# Patient Record
Sex: FEMALE | Race: BLACK OR AFRICAN AMERICAN | NOT HISPANIC OR LATINO | Employment: OTHER | URBAN - METROPOLITAN AREA
[De-identification: names, ages, dates, MRNs, and addresses within clinical notes are randomized per-mention and may not be internally consistent; named-entity substitution may affect disease eponyms.]

---

## 2017-03-12 ENCOUNTER — HOSPITAL ENCOUNTER (OUTPATIENT)
Dept: ADMINISTRATIVE | Facility: HOSPITAL | Age: 57
End: 2017-03-13
Attending: INTERNAL MEDICINE | Admitting: INTERNAL MEDICINE

## 2017-09-21 ENCOUNTER — HISTORICAL (OUTPATIENT)
Dept: RADIOLOGY | Facility: HOSPITAL | Age: 57
End: 2017-09-21

## 2017-10-26 ENCOUNTER — HISTORICAL (OUTPATIENT)
Dept: RADIOLOGY | Facility: HOSPITAL | Age: 57
End: 2017-10-26

## 2018-02-28 ENCOUNTER — HOSPITAL ENCOUNTER (OUTPATIENT)
Dept: MEDSURG UNIT | Facility: HOSPITAL | Age: 58
End: 2018-03-01
Attending: SURGERY | Admitting: SURGERY

## 2018-02-28 LAB
ABS NEUT (OLG): 7.3 X10(3)/MCL (ref 1.5–6.9)
ALBUMIN SERPL-MCNC: 4.1 GM/DL (ref 3.4–5)
ALBUMIN/GLOB SERPL: 0.9 RATIO
ALP SERPL-CCNC: 166 UNIT/L (ref 30–113)
ALT SERPL-CCNC: 22 UNIT/L (ref 10–45)
APTT PPP: 28.5 SECOND(S) (ref 25–35)
AST SERPL-CCNC: 16 UNIT/L (ref 15–37)
BASOPHILS # BLD AUTO: 0 X10(3)/MCL (ref 0–0.1)
BASOPHILS NFR BLD AUTO: 0 % (ref 0–1)
BILIRUB SERPL-MCNC: 0.5 MG/DL (ref 0.1–0.9)
BILIRUBIN DIRECT+TOT PNL SERPL-MCNC: 0.2 MG/DL (ref 0–0.3)
BILIRUBIN DIRECT+TOT PNL SERPL-MCNC: 0.3 MG/DL
BUN SERPL-MCNC: 26 MG/DL (ref 10–20)
CALCIUM SERPL-MCNC: 9.7 MG/DL (ref 8–10.5)
CHLORIDE SERPL-SCNC: 97 MMOL/L (ref 100–108)
CO2 SERPL-SCNC: 28 MMOL/L (ref 21–35)
CREAT SERPL-MCNC: 6.12 MG/DL (ref 0.7–1.3)
EOSINOPHIL # BLD AUTO: 0 X10(3)/MCL (ref 0–0.6)
EOSINOPHIL NFR BLD AUTO: 0 % (ref 0–5)
ERYTHROCYTE [DISTWIDTH] IN BLOOD BY AUTOMATED COUNT: 14.7 % (ref 11.5–17)
GLOBULIN SER-MCNC: 4.6 GM/DL
GLUCOSE SERPL-MCNC: 98 MG/DL (ref 75–116)
HCT VFR BLD AUTO: 45.4 % (ref 36–48)
HGB BLD-MCNC: 14.2 GM/DL (ref 12–16)
INR PPP: 1 (ref 0–1.2)
LIPASE SERPL-CCNC: 4186 UNIT/L (ref 21–261)
LYMPHOCYTES # BLD AUTO: 1.4 X10(3)/MCL (ref 0.5–4.1)
LYMPHOCYTES NFR BLD AUTO: 14.3 % (ref 15–40)
MCH RBC QN AUTO: 32 PG (ref 27–34)
MCHC RBC AUTO-ENTMCNC: 31 GM/DL (ref 31–36)
MCV RBC AUTO: 102 FL (ref 80–99)
MONOCYTES # BLD AUTO: 0.9 X10(3)/MCL (ref 0–1.1)
MONOCYTES NFR BLD AUTO: 9 % (ref 4–12)
NEUTROPHILS # BLD AUTO: 7.3 X10(3)/MCL (ref 1.5–6.9)
NEUTROPHILS NFR BLD AUTO: 76 % (ref 43–75)
PLATELET # BLD AUTO: 119 X10(3)/MCL (ref 140–400)
PMV BLD AUTO: 11.8 FL (ref 6.8–10)
POTASSIUM SERPL-SCNC: 3.7 MMOL/L (ref 3.6–5.2)
PROT SERPL-MCNC: 8.7 GM/DL (ref 6.4–8.2)
PROTHROMBIN TIME: 10.6 SECOND(S) (ref 9–12)
RBC # BLD AUTO: 4.43 X10(6)/MCL (ref 4.2–5.4)
SODIUM SERPL-SCNC: 139 MMOL/L (ref 135–145)
WBC # SPEC AUTO: 9.6 X10(3)/MCL (ref 4.5–11.5)

## 2018-03-01 LAB
ABS NEUT (OLG): 8.7 X10(3)/MCL (ref 1.5–6.9)
BASOPHILS # BLD AUTO: 0 X10(3)/MCL (ref 0–0.1)
BASOPHILS NFR BLD AUTO: 0 % (ref 0–1)
EOSINOPHIL # BLD AUTO: 0 X10(3)/MCL (ref 0–0.6)
EOSINOPHIL NFR BLD AUTO: 0 % (ref 0–5)
ERYTHROCYTE [DISTWIDTH] IN BLOOD BY AUTOMATED COUNT: 14.5 % (ref 11.5–17)
HCT VFR BLD AUTO: 39.2 % (ref 36–48)
HGB BLD-MCNC: 12 GM/DL (ref 12–16)
LYMPHOCYTES # BLD AUTO: 0.8 X10(3)/MCL (ref 0.5–4.1)
LYMPHOCYTES NFR BLD AUTO: 7.7 % (ref 15–40)
MCH RBC QN AUTO: 32 PG (ref 27–34)
MCHC RBC AUTO-ENTMCNC: 31 GM/DL (ref 31–36)
MCV RBC AUTO: 104 FL (ref 80–99)
MONOCYTES # BLD AUTO: 0.7 X10(3)/MCL (ref 0–1.1)
MONOCYTES NFR BLD AUTO: 6 % (ref 4–12)
NEUTROPHILS # BLD AUTO: 8.7 X10(3)/MCL (ref 1.5–6.9)
NEUTROPHILS NFR BLD AUTO: 86 % (ref 43–75)
PLATELET # BLD AUTO: 103 X10(3)/MCL (ref 140–400)
PMV BLD AUTO: 11.9 FL (ref 6.8–10)
RBC # BLD AUTO: 3.75 X10(6)/MCL (ref 4.2–5.4)
WBC # SPEC AUTO: 10.2 X10(3)/MCL (ref 4.5–11.5)

## 2018-09-11 ENCOUNTER — HISTORICAL (OUTPATIENT)
Dept: RADIOLOGY | Facility: HOSPITAL | Age: 58
End: 2018-09-11

## 2019-01-22 ENCOUNTER — HOSPITAL ENCOUNTER (OUTPATIENT)
Dept: MEDSURG UNIT | Facility: HOSPITAL | Age: 59
End: 2019-01-23
Attending: INTERNAL MEDICINE | Admitting: INTERNAL MEDICINE

## 2019-01-22 LAB
ABS NEUT (OLG): 8.6 X10(3)/MCL (ref 1.5–6.9)
ALBUMIN SERPL-MCNC: 4.2 GM/DL (ref 3.4–5)
ALBUMIN/GLOB SERPL: 1 RATIO
ALP SERPL-CCNC: 160 UNIT/L (ref 30–113)
ALT SERPL-CCNC: 49 UNIT/L (ref 10–45)
AST SERPL-CCNC: 32 UNIT/L (ref 15–37)
BILIRUB SERPL-MCNC: 0.5 MG/DL (ref 0.1–0.9)
BILIRUBIN DIRECT+TOT PNL SERPL-MCNC: 0.2 MG/DL (ref 0–0.3)
BILIRUBIN DIRECT+TOT PNL SERPL-MCNC: 0.3 MG/DL
BUN SERPL-MCNC: 77 MG/DL (ref 10–20)
CALCIUM SERPL-MCNC: 10.8 MG/DL (ref 8–10.5)
CHLORIDE SERPL-SCNC: 96 MMOL/L (ref 100–108)
CO2 SERPL-SCNC: 23 MMOL/L (ref 21–35)
CREAT SERPL-MCNC: 10.99 MG/DL (ref 0.7–1.3)
ERYTHROCYTE [DISTWIDTH] IN BLOOD BY AUTOMATED COUNT: 15.9 % (ref 11.5–17)
GLOBULIN SER-MCNC: 4.4 GM/DL
GLUCOSE SERPL-MCNC: 115 MG/DL (ref 75–116)
HCT VFR BLD AUTO: 39.8 % (ref 36–48)
HGB BLD-MCNC: 12.2 GM/DL (ref 12–16)
IMM GRANULOCYTES # BLD AUTO: 0.06 10*3/UL (ref 0–0.02)
IMM GRANULOCYTES NFR BLD AUTO: 0.5 % (ref 0–0.43)
LIPASE SERPL-CCNC: 694 UNIT/L (ref 21–261)
LYMPHOCYTES # BLD AUTO: 2.1 X10(3)/MCL (ref 0.5–4.1)
LYMPHOCYTES NFR BLD AUTO: 18 % (ref 15–40)
MACROCYTES BLD QL SMEAR: ABNORMAL
MCH RBC QN AUTO: 33 PG (ref 27–34)
MCHC RBC AUTO-ENTMCNC: 31 GM/DL (ref 31–36)
MCV RBC AUTO: 108 FL (ref 80–99)
MONOCYTES # BLD AUTO: 1.1 X10(3)/MCL (ref 0–1.1)
MONOCYTES NFR BLD AUTO: 9 % (ref 4–12)
NEUTROPHILS # BLD AUTO: 8.6 X10(3)/MCL (ref 1.5–6.9)
NEUTROPHILS NFR BLD AUTO: 72 % (ref 43–75)
PLATELET # BLD AUTO: 114 X10(3)/MCL (ref 140–400)
PLATELET # BLD EST: ABNORMAL 10*3/UL
PMV BLD AUTO: 11.8 FL (ref 6.8–10)
POTASSIUM SERPL-SCNC: 6.1 MMOL/L (ref 3.6–5.2)
PROT SERPL-MCNC: 8.6 GM/DL (ref 6.4–8.2)
RBC # BLD AUTO: 3.68 X10(6)/MCL (ref 4.2–5.4)
SODIUM SERPL-SCNC: 137 MMOL/L (ref 135–145)
WBC # SPEC AUTO: 11.9 X10(3)/MCL (ref 4.5–11.5)

## 2019-01-23 LAB
CHOLEST SERPL-MCNC: 141 MG/DL (ref 140–200)
CHOLEST/HDLC SERPL: 2 MG/DL (ref 0–8)
HDLC SERPL-MCNC: 61 MG/DL (ref 35–59)
LDLC SERPL CALC-MCNC: 68 MG/DL (ref 100–129)
LIPASE SERPL-CCNC: 454 UNIT/L (ref 21–261)
TRIGL SERPL-MCNC: 70 MG/DL (ref 35–150)
VLDLC SERPL CALC-MCNC: 14 MG/DL

## 2019-04-11 ENCOUNTER — HOSPITAL ENCOUNTER (OUTPATIENT)
Dept: INTENSIVE CARE | Facility: HOSPITAL | Age: 59
End: 2019-04-11
Attending: FAMILY MEDICINE | Admitting: FAMILY MEDICINE

## 2019-04-11 LAB
ABS NEUT (OLG): 6.6 X10(3)/MCL (ref 1.5–6.9)
ALBUMIN SERPL-MCNC: 2.9 GM/DL (ref 3.4–5)
ALBUMIN/GLOB SERPL: 0.6 RATIO
ALP SERPL-CCNC: 181 UNIT/L (ref 30–113)
ALT SERPL-CCNC: 8 UNIT/L (ref 10–45)
AMYLASE SERPL-CCNC: 73 UNIT/L (ref 25–115)
ANISOCYTOSIS BLD QL SMEAR: ABNORMAL
AST SERPL-CCNC: 18 UNIT/L (ref 15–37)
BASOPHILS # BLD AUTO: 0 X10(3)/MCL (ref 0–0.1)
BASOPHILS NFR BLD AUTO: 0 % (ref 0–1)
BILIRUB SERPL-MCNC: 0.7 MG/DL (ref 0.1–0.9)
BILIRUBIN DIRECT+TOT PNL SERPL-MCNC: 0.3 MG/DL (ref 0–0.3)
BILIRUBIN DIRECT+TOT PNL SERPL-MCNC: 0.4 MG/DL
BUN SERPL-MCNC: 17 MG/DL (ref 10–20)
CALCIUM SERPL-MCNC: 9.5 MG/DL (ref 8–10.5)
CHLORIDE SERPL-SCNC: 103 MMOL/L (ref 100–108)
CO2 SERPL-SCNC: 23 MMOL/L (ref 21–35)
CREAT SERPL-MCNC: 4.53 MG/DL (ref 0.7–1.3)
EOSINOPHIL # BLD AUTO: 0 X10(3)/MCL (ref 0–0.6)
EOSINOPHIL NFR BLD AUTO: 0 % (ref 0–5)
ERYTHROCYTE [DISTWIDTH] IN BLOOD BY AUTOMATED COUNT: 21.3 % (ref 11.5–17)
GLOBULIN SER-MCNC: 4.5 GM/DL
GLUCOSE SERPL-MCNC: 157 MG/DL (ref 75–116)
HCO3 UR-SCNC: 17.8 MMOL/L (ref 22–26)
HCT VFR BLD AUTO: 33 % (ref 36–48)
HGB BLD-MCNC: 10 GM/DL (ref 12–16)
IMM GRANULOCYTES # BLD AUTO: 0.13 10*3/UL (ref 0–0.02)
IMM GRANULOCYTES NFR BLD AUTO: 1.4 % (ref 0–0.43)
INR PPP: 1 (ref 0–1.2)
LACTATE SERPL-SCNC: 4.5 MMOL/L (ref 0.4–2)
LACTATE SERPL-SCNC: 5.1 MMOL/L (ref 0.4–2)
LACTATE SERPL-SCNC: 6.1 MMOL/L (ref 0.4–2)
LACTATE SERPL-SCNC: 6.4 MMOL/L (ref 0.4–2)
LACTATE SERPL-SCNC: 6.8 MMOL/L (ref 0.4–2)
LIPASE SERPL-CCNC: 255 UNIT/L (ref 21–261)
LYMPHOCYTES # BLD AUTO: 1.8 X10(3)/MCL (ref 0.5–4.1)
LYMPHOCYTES NFR BLD AUTO: 19 % (ref 15–40)
MACROCYTES BLD QL SMEAR: ABNORMAL
MAGNESIUM SERPL-MCNC: 2.2 MG/DL (ref 1.8–2.4)
MCH RBC QN AUTO: 32 PG (ref 27–34)
MCHC RBC AUTO-ENTMCNC: 30 GM/DL (ref 31–36)
MCV RBC AUTO: 106 FL (ref 80–99)
MONOCYTES # BLD AUTO: 0.8 X10(3)/MCL (ref 0–1.1)
MONOCYTES NFR BLD AUTO: 9 % (ref 4–12)
NEUTROPHILS # BLD AUTO: 6.6 X10(3)/MCL (ref 1.5–6.9)
NEUTROPHILS NFR BLD AUTO: 70 % (ref 43–75)
PCO2 BLDA: 26.4 MMHG (ref 35–45)
PH SMN: 7.44 [PH] (ref 7.35–7.45)
PHOSPHATE SERPL-MCNC: 2.2 MG/DL (ref 2.6–4.7)
PLATELET # BLD AUTO: 138 X10(3)/MCL (ref 140–400)
PLATELET # BLD EST: ADEQUATE 10*3/UL
PMV BLD AUTO: 10.3 FL (ref 6.8–10)
PO2 BLDA: 83 MMHG (ref 80–100)
POC ALLENS TEST: POSITIVE
POC BE: -6 MMOL/L (ref -2–3)
POC SAMPLESOURCE: NORMAL
POC SATURATED O2: 97 % (ref 96–97)
POC SITE: NORMAL
POC TCO2: 19 MMOL/L (ref 24–29)
POC TREATMENT: NORMAL
POIKILOCYTOSIS BLD QL SMEAR: ABNORMAL
POTASSIUM SERPL-SCNC: 2.6 MMOL/L (ref 3.6–5.2)
PROT SERPL-MCNC: 7.4 GM/DL (ref 6.4–8.2)
PROTHROMBIN TIME: 10.7 SECOND(S) (ref 9–12)
RBC # BLD AUTO: 3.12 X10(6)/MCL (ref 4.2–5.4)
RBC MORPH BLD: ABNORMAL
SODIUM SERPL-SCNC: 143 MMOL/L (ref 135–145)
TSH SERPL-ACNC: 5.41 MIU/ML (ref 0.36–3.74)
WBC # SPEC AUTO: 9.4 X10(3)/MCL (ref 4.5–11.5)

## 2022-04-10 ENCOUNTER — HISTORICAL (OUTPATIENT)
Dept: ADMINISTRATIVE | Facility: HOSPITAL | Age: 62
End: 2022-04-10
Payer: MEDICAID

## 2022-04-29 VITALS
DIASTOLIC BLOOD PRESSURE: 80 MMHG | WEIGHT: 199.94 LBS | BODY MASS INDEX: 39.25 KG/M2 | SYSTOLIC BLOOD PRESSURE: 124 MMHG | HEIGHT: 60 IN | OXYGEN SATURATION: 96 %

## 2022-04-30 NOTE — OP NOTE
Patient:   Dinorah Santos            MRN: [id number]            FIN: [id number]               Age:   58 years     Sex:  Female     :  1960   Associated Diagnoses:   ESRD on dialysis; AP - Abdominal pain; Acute appendicitis   Author:   Tonie Kaufman MD      Operative Note   Operative Information   Date/ Time:  2018 12:01:00.     Procedures Performed: Procedure Code   Laparoscopy, surgical, appendectomy (73129)..     Indications: 58-year-old -American female with multiple medical issues and high suspicion of acute appendicitis based upon imaging and clinical evaluation.     Preoperative Diagnosis: ESRD on dialysis (UVR84-GF N18.6), AP - Abdominal pain (PNED [id number]), Acute appendicitis (FDT11-YT K35.80).     Postoperative Diagnosis: ESRD on dialysis (AKT95-XW N18.6), AP - Abdominal pain (PNED [id number]), Acute appendicitis (GIU67-GY K35.80).     Surgeon: Tonie Kaufman MD.     Anesthesia: Gen..     Speciman Removed: appendix and mesoappendix.     Description of Procedure/Findings/    Complications: procedure in detail:  After appropriate consents were obtained the patient was brought to the operating theater laid in the supine position.  General endotracheal intubation and anesthesia were performed without incident.  Then the abdomen from the nipples down to bilateral groins were sterilely prepped and draped in normal surgical fashion using chlorhexidine.    An infraumbilical site was infiltrated with 1% lidocaine.   a #15 blade was used to dissect down to the level of the fascia.  A Veress needle was introduced in the abdomen on the 1st pass and the abdominal cavity was insufflated to 15 mm Hg pressure.  A 5 mm Visiport trochar  was introduced into the abdomen without incident.  The abdominal contents were then inspected.  In the right lower quadrant was noted to be some turbid fluid along with a grossly edematous and   inflamed appendix.  There was noted to be the start of necrosis at the tip without gross perforation.    A 2nd dissecting trocar was then placed in the suprapubic region measuring 5 mm in size  under direct visualization and then the left lower quadrant 12 mm trocar was placed under direct visualization.    The patient was then placed in slight Trendelenburg with right side up and the small bowel located within the pelvis was advanced to the right upper quadrant using atraumatic graspers.    The appendix was lightly grasping the antimesenteric border close to the base  and dissection of the mesoappendix was performed using an Endo Harmonic scalpel.   Appendiceal vessel was taken without incident or bloody oozing  and the base of the appendix was transected using a linear endo-stapling device blue load.   The base of the appendix was transected without incident and visually inspected  for staple line  abnormalities or bleeding.   Neither were noted and the appendix was placed within an Endo Catch bag.    The right colic gutter was then irrigated well with saline and suctioned of all fibrinous debris and fluid collections.  The base of the appendix was monitored for a period of time to identify any oozing from transected vessels or on the appendiceal tip and none were noted.   The Endo Catch bag was retrieved to the lower left quadrant port site without difficulty.  The remaining trochars were then removed under direct visualization and no bleeding was noted on the abdominal wall.  The left lower quadrant 12 mm trocar site was closed using 0 Vicryl in a figure-of-eight fashion.   The skin overlying each of the trocar sites were then closed using 4-0 Monocryl in a subcuticular fashion.   a sterile dressing was placed over the 3 trocar sites.  The patient was relieved of anesthesia in a stable condition,  all  instrument counts were correct ×3.  the patient was then transferred to postanesthesia care unit in stable  condition  .     Esimated blood loss: loss  3  cc.     Findings: acute appendicitis.     Complications: None.

## 2022-04-30 NOTE — ED PROVIDER NOTES
Patient:   Dinorah Santos            MRN: [id number]            FIN: [id number]               Age:   58 years     Sex:  Female     :  1960   Associated Diagnoses:   Acute appendicitis   Author:   Yaya King MD      Basic Information   Time seen: Date & time 2018 17:20:00.   History source: Patient, EMS.   Arrival mode: Ambulance.   History limitation: Cognitive impairment.   Additional information: Chief Complaint from Nursing Triage Note : Chief Complaint   2018 16:49 CST      Chief Complaint           brought per [facility] with c/o Lt lower abd pain since this am  .      History of Present Illness   The patient presents with abdominal pain.  The onset was this am.  The course/duration of symptoms is worsening.  The character of symptoms is sharp.  The degree at onset was moderate.  The Location of pain at onset was left, upper and abdominal.  The degree at present is moderate.  The Location of pain at present is left, lower and abdominal.  Radiating pain: none. The exacerbating factor is none.  The relieving factor is none.  Therapy today: none.  Risk factors consist of obesity and ESRD on HD.  Associated symptoms: vomiting, Vomiting times once after arrival in ED., denies diarrhea, denies fever and denies chills.        Review of Systems   Constitutional symptoms:  Negative except as documented in HPI.   Skin symptoms:  Negative except as documented in HPI.   Eye symptoms:  Negative except as documented in HPI.   ENMT symptoms:  Negative except as documented in HPI.   Respiratory symptoms:  Negative except as documented in HPI.   Cardiovascular symptoms:  Negative except as documented in HPI.   Gastrointestinal symptoms:  Abdominal pain, moderate, left lower quadrant, vomiting.    Genitourinary symptoms:  Negative except as documented in HPI.   Musculoskeletal symptoms:  Negative except as documented in HPI.   Neurologic symptoms:  Negative except as documented in HPI.   Psychiatric  symptoms:  Negative except as documented in HPI.   Endocrine symptoms:  Negative except as documented in HPI.   Hematologic/Lymphatic symptoms:  Negative except as documented in HPI.   Allergy/immunologic symptoms:  Negative except as documented in HPI.             Additional review of systems information: All systems reviewed as documented in chart.      Health Status   Allergies:    Allergies (1) Active Reaction  No Known Allergies None Documented  , no known allergies.   Medications:  (Selected)   Prescriptions  Prescribed  MiraLax oral powder for reconstitution: 17 gm, Oral, Daily, dissolve in water before taking, X 90 day(s), # 1,530 gm, 1 Refill(s)  Phenergan 25 mg Tab: 25 mg = 1 tab(s), Oral, q6hr, PRN PRN as needed for nausea/vomiting, # 4 tab(s), 0 Refill(s), Pharmacy: [facility] [place]  Documented Medications  Documented  Aripiprazole 30 Mg Tablet: 30 mg = 1 tab(s), Oral, qPM  Docu Soft 100 mg oral capsule: 100 mg = 1 cap(s), Oral, BID, PRN PRN for constipation, 0 Refill(s)  OLANZapine 10 mg oral tablet: 10 mg = 1 tab(s), Oral, At Bedtime, # 30 tab(s), 0 Refill(s)  Renvela 800 mg oral tablet: 2,400 mg = 3 tab(s), Oral, TID, # 270 tab(s), 0 Refill(s)  Sensipar 30 mg oral tablet: 30 mg = 1 tab(s), Oral, Daily, # 30 tab(s), 0 Refill(s)  aspirin 81 mg oral tablet: 81 mg = 1 tab(s), Oral, Daily, # 90 tab(s), 0 Refill(s)  atorvastatin 20 mg oral tablet: 20 mg = 1 tab(s), Oral, At Bedtime, # 90 tab(s), 0 Refill(s)  clonazePAM 1 mg oral tablet: 1 mg = 1 tab(s), Oral, Daily, 0 Refill(s)  divalproex sodium 250 mg oral delayed release tablet: 250 mg = 1 tab(s), Oral, BID, # 270 tab(s), 0 Refill(s)  levothyroxine 175 mcg (0.175 mg) oral tablet: 175 mcg = 1 tab(s), Oral, Daily, # 30 tab(s), 0 Refill(s)  midodrine 10 mg oral tablet: 10 mg = 1 tab(s), Oral, TID, # 90 tab(s), 0 Refill(s)  oxcarbazepine 600 mg oral tablet: 600 mg = 1 tab(s), Oral, BID, # 60 tab(s), 0 Refill(s)  traZODone 100 mg oral  tablet: 100 mg = 1 tab(s), Oral, At Bedtime, # 180 tab(s), 0 Refill(s)  zolpidem 10 mg oral tablet: 10 mg = 1 tab(s), Oral, Once a day (at bedtime), PRN PRN as needed for sleep, 0 Refill(s).      Past Medical/ Family/ Social History   Medical history:    Active  ESRD on dialysis ([id number])  Hyperlipidemia (58140543)  Hypothyroid (553786840)  Resolved  Schizophrenia (70061643):  Resolved..   Surgical history:    Knee arthroplasty ([id number]).  Comments:  9/28/2017 06:30 - Stefnaia JUSTIN, Allie hansen.   Family history:    Unable to obtain..   Social history:    Social & Psychosocial Habits    Alcohol  02/20/2017  Use: Never    Substance Abuse  02/20/2017  Use: Never    Tobacco  02/20/2017  Use: Never smoker  , Alcohol use: Denies, Tobacco use: Denies, Drug use: Denies, Occupation: On disability, Family/social situation: Unmarried.   Problem list:    Active Problems (3)  ESRD on dialysis   Hypothyroid   Obesity   .      Physical Examination               Vital Signs   Vital Signs   2/28/2018 16:49 CST      Temperature Temporal Artery               36.1 DegC  LOW                             Peripheral Pulse Rate     83 bpm                             Respiratory Rate          16 br/min                             SpO2                      97 %                             Oxygen Therapy            Room air                             Systolic Blood Pressure   122 mmHg                             Diastolic Blood Pressure  64 mmHg  .      Vital Signs (last 24 hrs)_____  Last Charted___________  Heart Rate Peripheral   83 bpm  (FEB 28 16:49)  Resp Rate         16 br/min  (FEB 28 16:49)  SBP      122 mmHg  (FEB 28 16:49)  DBP      64 mmHg  (FEB 28 16:49)  SpO2      97 %  (FEB 28 16:49)  .   Measurements   2/28/2018 16:49 CST      Weight Dosing             90.7 kg                             Weight Measured and Calculated in Lbs     199.96 lb                             Weight Estimated           90.7 kg                             Height/Length Dosing      152 cm                             Height/Length Estimated   154 cm                             Body Mass Index Estimated 38.24 kg/m2  .   Basic Oxygen Information   2/28/2018 16:49 CST      SpO2                      97 %                             Oxygen Therapy            Room air  .   General:  Alert, no acute distress.    Skin:  Warm, dry, intact, normal for ethnicity.    Head:  Normocephalic, atraumatic.    Neck:  Supple, no tenderness.    Eye:  Pupils are equal, round and reactive to light, extraocular movements are intact.    Ears, nose, mouth and throat:  Oral mucosa moist.   Cardiovascular:  Regular rate and rhythm, No murmur, Normal peripheral perfusion, No edema.    Respiratory:  Lungs are clear to auscultation, respirations are non-labored, breath sounds are equal, Symmetrical chest wall expansion.    Chest wall:  No tenderness, No deformity.    Back:  Nontender, Normal range of motion, Normal alignment.    Musculoskeletal:  Normal ROM, normal strength, no tenderness, no swelling, no deformity.    Gastrointestinal:  Soft, Non distended, Normal bowel sounds, Tenderness: Moderate, left upper quadrant, left lower quadrant.    Neurological:  Alert and oriented to person, place, time, and situation, No focal neurological deficit observed, normal sensory observed, normal motor observed, normal speech observed, normal coordination observed, Gait: Nonambulatory at baseline.    Lymphatics:  No lymphadenopathy.   Psychiatric:  Cooperative, appropriate mood & affect, normal judgment.       Medical Decision Making   Documents reviewed:  Emergency department nurses' notes.   Results review:  Lab results : Lab View   2/28/2018 17:40 CST      Sodium Lvl                139 mmol/L                             Potassium Lvl             3.7 mmol/L                             Chloride                  97 mmol/L  LOW                             CO2                        28 mmol/L                             Calcium Lvl               9.7 mg/dL                             Glucose Lvl               98 mg/dL                             BUN                       26 mg/dL  HI                             Creatinine                6.12 mg/dL  CRIT                             eGFR-AA                   9 mL/min/1.73 m2  NA                             eGFR-LAN                  7 mL/min/1.73 m2  NA                             Bili Total                0.5 mg/dL                             Bili Direct               0.20 mg/dL                             Bili Indirect             0.30 mg/dL  NA                             AST                       16 unit/L                             ALT                       22 unit/L                             Alk Phos                  166 unit/L  HI                             Total Protein             8.7 gm/dL  HI                             Albumin Lvl               4.1 gm/dL                             Globulin                  4.60 gm/dL  NA                             A/G Ratio                 0.9 ratio  NA                             Lipase Lvl                4,186 unit/L  HI                             WBC                       9.6 x10(3)/mcL                             RBC                       4.43 x10(6)/mcL                             Hgb                       14.2 gm/dL                             Hct                       45.4 %                             Platelet                  119 x10(3)/mcL  LOW                             MCV                       102 fL  HI                             MCH                       32 pg                             MCHC                      31 gm/dL                             RDW                       14.7 %                             MPV                       11.8 fL  HI                             Abs Neut                  7.3 x10(3)/mcL  HI                             Neutro Auto               76  %  HI                             Lymph Auto                14.30 %  LOW                             Mono Auto                 9 %                             Eos Auto                  0 %                             Abs Eos                   0.0 x10(3)/mcL                             Basophil Auto             0 %                             Abs Neutro                7.3 x10(3)/mcL  HI                             Abs Lymph                 1.4 x10(3)/mcL                             Abs Mono                  0.9 x10(3)/mcL                             Abs Baso                  0.0 x10(3)/mcL  ,    No qualifying data available.    Radiology results:  Rad Results (ST)  < 12 hrs   Accession: [id number]  Order: CT Abdomen and Pelvis W/O Contrast  Report Dt/Tm: 02/28/2018 19:10  Report:   CT ABDOMEN AND PELVIS WITHOUT CONTRAST:     HISTORY: Abdominal Pain          PATIENT RADIATION DOSE:  CTDI vol(mGy) 35.70                                                 DLP(mGycm)  1830.20      As per PQRS measures, all CT scans at this facility used dose  modulation, iterative reconstruction, and/or weight based dose  adjustment when appropriate to reduce radiation dose to as low as  reasonably achievable.     COMPARISON: 9/28/2017     FINDINGS: Serial axial images were obtained through the abdomen and  pelvis without the administration of  IV contrast. Coronal and  sagittal reconstructions were also obtained. Atherosclerosis is seen  within the aorta and branching vessels. There is a stent at the  superior vena cava. The heart is normal in size. There is elevation of  the right hemidiaphragm. Early infiltrate and/or atelectasis is  evident at the posterior right lung base and anterior lateral left  lingular segment. Degenerative changes are again noted to the  thoracolumbar spine with anterior listhesis of L5 on S1 and bilateral  spondylolysis at L5. A small fatty umbilical hernia is again  identified. Feces is scattered throughout  the colon.. The liver is  mildly enlarged. The spleen is borderline enlarged. There is mild  mucosal prominence of the distal esophagus/small hiatus hernia. The  adrenal glands, pancreas, and gallbladder are grossly within normal  limits. Multiple round cystic foci are again scattered throughout the  kidneys bilaterally with very limited residual renal parenchyma. No  obvious hydronephrosis is seen. Scattered small punctate  calcifications are again noted to the kidneys bilaterally. There is  fatty mucosal prominence at the cecum. There is interim mucosal  thickening and enlargement of the appendix with mild surrounding  edema/stranding compatible with a acute appendicitis. Scattered  calcifications are seen within the appendix compatible with  appendicolith. The uterus is grossly normal in size. The bladder is  nondistended. No dilated loops of bowel are identified. No significant  free fluid collection is seen.     IMPRESSION:  1. Interim development of mucosal thickening and enlargement with  surrounding edema at the appendix compatible with a acute appendicitis  2. 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 Grade 2 spondylolisthesis of L5 on S1 with bilateral spondylolysis  at L5  4. Early infiltrate and/or atelectasis posterior right lower lung and  anterior lateral left finger segment  5. Elevation of the right hemidiaphragm  6. Mild hepatomegaly  7. Borderline splenomegaly  8. Atherosclerosis  9. Small fatty umbilical hernia  10. The emergency room physician was notified of the findings on  2/20/2018 at 7:00 PM.      .      Reexamination/ Reevaluation   Vital signs   Basic Oxygen Information   2/28/2018 16:49 CST      SpO2                      97 %                             Oxygen Therapy            Room air        Impression and Plan   Diagnosis   Acute appendicitis (PEN55-CQ K35.80)   Plan   Condition: Stable.    Disposition: Patient care  transitioned to: Time: 2/28/2018 19:24:00, Christine MANNING, Yaya COFFEY       Addendum   Dr Kaufman agreed to admit Patient to surgery and will consult Dr Cook for medical consultation

## 2022-04-30 NOTE — ED PROVIDER NOTES
Patient:   Dinorah Santos            MRN: [id number]            FIN: [id number]               Age:   59 years     Sex:  Female     :  1960   Associated Diagnoses:   None   Author:   Blayne Thomas MD      Procedure   CPR   Time: 2019 17:35:00 .    CPR was performed: In addition to other critical care activities, Per American Heart Association (AHA) guidelines.    Performed by: Nurse.    Supervision: I directly supervised the performance of CPR on this patient.    Total time: 30 minutes.

## 2022-04-30 NOTE — CONSULTS
Patient:   Dinorah Santos            MRN: [id number]            FIN: [id number]               Age:   58 years     Sex:  Female     :  1960   Associated Diagnoses:   Acute appendicitis; AP - Abdominal pain; ESRD on dialysis; Hypothyroid; Schizophrenia   Author:   Shelton Matthews MD, Alex      Consultation Information   Consultation:  medical management .       Basic Information   Source of history:  Self.    Present at bedside:  Medical personnel.    Referral source:  Emergency department.    History limitation:  None.       Chief Complaint   left dside abdominal pain       History of Present Illness   57 y/o aaf presented to the ER with a c/o LLQ abdominal pain  for the last 24 hrs which has gotten worse . She describe the pain as constant , sharp , 10/10 ,  no radiation  and [id number] with nausea .  She denie snay fever , chest pain  , sob , palpitation  , HA , Diarrhea  or  sx .  CT abdomen show  acute appendicitis . Dr Shae arguello to admit the pt  . IM was consulted to help with the medical management .       Review of Systems   Constitutional:  No fever.    Ear/Nose/Mouth/Throat:  Negative.    Respiratory:  No shortness of breath, No cough, No hemoptysis, No wheezing.    Cardiovascular:  No chest pain, No tachycardia, No peripheral edema.    Gastrointestinal:  No nausea, No vomiting, No diarrhea, No constipation, No heartburn, No hematemesis     Abdominal pain: Left, Lower quadrant, The severity is moderate, Characterized as ( Sharp, Continuous ).    Genitourinary:  No dysuria, No hematuria.    Hematology/Lymphatics:  Negative.    Endocrine:  No polyuria, No cold intolerance, No heat intolerance.    Immunologic:  Negative.    Musculoskeletal:  Negative.    Integumentary:  No rash.    Neurologic:  Alert and oriented X4, No confusion, No numbness, No headache.    Psychiatric:  No anxiety, No depression.    All other systems are negative      Health Status   Allergies:    Allergic  Reactions (Selected)  No Known Allergies   Current medications:  (Selected)   Inpatient Medications  Ordered  ARIPiprazole 30 mg oral tablet: 30 mg, form: Tab, Oral, qPM, first dose 03/01/18 21:00:00 CST  Flagyl 500 mg / 100 ml premix: 500 mg, form: Infusion, IV Piggyback, BID, Infuse over: 1 hr, first dose 02/28/18 21:00:00 CST  IVF Normal Saline NS Infusion 1,000 mL: 1,000 mL, 1,000 mL, IV, 150 mL/hr, start date 02/28/18 20:40:00 CST  OLANZapine 10 mg oral tablet: 10 mg, form: Tab, Oral, At Bedtime, first dose 03/01/18 21:00:00 CST  Renvela 800 mg oral tablet: 2,400 mg, form: Tab, Oral, TID, first dose 03/01/18 6:00:00 CST  Sensipar: 30 mg, form: Tab, Oral, Daily, first dose 03/01/18 9:00:00 CST  atorvastatin 20 mg oral tablet: 20 mg, form: Tab, Oral, At Bedtime, first dose 03/01/18 21:00:00 CST  clonazePAM 1 mg oral tablet: 1 mg, form: Tab, Oral, Daily, first dose 03/01/18 9:00:00 CST  divalproex  mg (DR) oral delayed release (EC) tablet: 250 mg, form: Tab-EC, Oral, BID, first dose 03/01/18 9:00:00 CST  levothyroxine 88 mcg (0.088 mg) oral tablet: 176 mcg, form: Tab, Oral, Daily, first dose 03/01/18 6:00:00 CST  midodrine: 10 mg, form: Tab, Oral, TID, first dose 03/01/18 6:00:00 CST  morphine 4 mg/mL preservative-free intravenous solution: 2 mg, form: Injection, IV, q4hr PRN for pain, first dose 03/01/18 0:36:00 CST  oxcarbazepine 600 mg oral tablet: 600 mg, form: Tab, Oral, BID, first dose 03/01/18 9:00:00 CST  traZODone: 100 mg, form: Tab, Oral, At Bedtime, first dose 03/01/18 21:00:00 CST  Prescriptions  Prescribed  MiraLax oral powder for reconstitution: 17 gm, Oral, Daily, dissolve in water before taking, X 90 day(s), # 1,530 gm, 1 Refill(s)  Phenergan 25 mg Tab: 25 mg = 1 tab(s), Oral, q6hr, PRN PRN as needed for nausea/vomiting, # 4 tab(s), 0 Refill(s), Pharmacy: [facility] [place]  Documented Medications  Documented  Aripiprazole 30 Mg Tablet: 30 mg = 1 tab(s), Oral, qPM  Docu Soft  100 mg oral capsule: 100 mg = 1 cap(s), Oral, BID, PRN PRN for constipation, 0 Refill(s)  OLANZapine 10 mg oral tablet: 10 mg = 1 tab(s), Oral, At Bedtime, # 30 tab(s), 0 Refill(s)  Renvela 800 mg oral tablet: 2,400 mg = 3 tab(s), Oral, TID, # 270 tab(s), 0 Refill(s)  Sensipar 30 mg oral tablet: 30 mg = 1 tab(s), Oral, Daily, # 30 tab(s), 0 Refill(s)  aspirin 81 mg oral tablet: 81 mg = 1 tab(s), Oral, Daily, # 90 tab(s), 0 Refill(s)  atorvastatin 20 mg oral tablet: 20 mg = 1 tab(s), Oral, At Bedtime, # 90 tab(s), 0 Refill(s)  clonazePAM 1 mg oral tablet: 1 mg = 1 tab(s), Oral, Daily, 0 Refill(s)  divalproex sodium 250 mg oral delayed release tablet: 250 mg = 1 tab(s), Oral, BID, # 270 tab(s), 0 Refill(s)  levothyroxine 175 mcg (0.175 mg) oral tablet: 175 mcg = 1 tab(s), Oral, Daily, # 30 tab(s), 0 Refill(s)  midodrine 10 mg oral tablet: 10 mg = 1 tab(s), Oral, TID, # 90 tab(s), 0 Refill(s)  oxcarbazepine 600 mg oral tablet: 600 mg = 1 tab(s), Oral, BID, # 60 tab(s), 0 Refill(s)  traZODone 100 mg oral tablet: 100 mg = 1 tab(s), Oral, At Bedtime, # 180 tab(s), 0 Refill(s)  zolpidem 10 mg oral tablet: 10 mg = 1 tab(s), Oral, Once a day (at bedtime), PRN PRN as needed for sleep, 0 Refill(s)      Histories   Past Medical History:    Active  ESRD on dialysis ([id number])  Hypothyroid ([id number])  Hyperlipidemia (08287005)  Resolved  Schizophrenia ([id number]):  Resolved.   Family History:    Unable to obtain.   Procedure history:    Appendectomy Laparoscopic on 2/28/2018 at 58 Years.  Comments:  2/28/2018 23:13 - Verónica JUSTIN, Berenice  auto-populated from documented surgical case  Knee arthroplasty ([id number]).  Comments:  9/28/2017 06:30 - Allie Aguiar RN  right  c/s.  Breast reduction, bilateral ([id number]).  av graft.   Social History        Social & Psychosocial Habits    Alcohol  03/01/2018  Use: Past    Type: Beer, Liquor    Substance Abuse  02/20/2017  Use: Never    Tobacco  03/01/2018   Use: Former smoker    Type: Cigarettes  .        Physical Examination      Vital Signs (last 24 hrs)_____  Last Charted___________  Temp Oral     36.9 DegC  (MAR 01 08:00)  Heart Rate Peripheral   78 bpm  (MAR 01 02:00)  Resp Rate         20 br/min  (MAR 01 08:00)  SBP      94 mmHg  (MAR 01 08:00)  DBP      L 58mmHg  (MAR 01 08:00)  SpO2      96 %  (MAR 01 08:00)  Weight      90.7 kg  (FEB 28 23:53)  Height      152 cm  (FEB 28 23:53)  BMI      39.26  (FEB 28 23:53)     Measurements from flowsheet : Measurements   2/28/2018 23:53 CST      Weight Dosing             90.7 kg                             Weight Measured           90.7 kg                             Weight Measured and Calculated in Lbs     199.96 lb                             Weighing Method           Standing                             Height/Length Dosing      152 cm                             Height/Length Measured    152 cm                             BSA Measured              1.96 m2                             Body Mass Index Measured  39.26 kg/m2                             Ideal Body Weight Calculated              45 kg    2/28/2018 21:32 CST      Weight Dosing             Not Done: Task Duplication  (Not Done)                            Height/Length Dosing      Not Done: Task Duplication  (Not Done)   2/28/2018 16:49 CST      Weight Dosing             90.7 kg                             Weight Measured and Calculated in Lbs     199.96 lb                             Weight Estimated          90.7 kg                             Height/Length Dosing      152 cm                             Height/Length Estimated   154 cm                             Body Mass Index Estimated 38.24 kg/m2     General:  Alert and oriented, No acute distress.    Eye:  Pupils are equal, round and reactive to light, Extraocular movements are intact, Normal conjunctiva.    HENT:  Normocephalic, Tympanic membranes are clear.    Neck:  Supple, Non-tender, No carotid  bruit, No jugular venous distention, No lymphadenopathy, No thyromegaly.    Respiratory:  Lungs are clear to auscultation, Breath sounds are equal, Symmetrical chest wall expansion, No chest wall tenderness.    Cardiovascular:  Normal rate, Regular rhythm, No murmur, No gallop, No edema.    Gastrointestinal:  Non-tender, Non-distended, Normal bowel sounds, No organomegaly, s/p lap for apendectomy .    Genitourinary:  No costovertebral angle tenderness.    Lymphatics:  No lymphadenopathy neck, axilla, groin.    Musculoskeletal:  Normal range of motion, Normal strength, No swelling.    Integumentary:  Warm.    Neurologic:  Alert, Oriented, Normal motor function, No focal deficits, Cranial Nerves II-XII are grossly intact, Normal deep tendon reflexes.    Psychiatric:  Cooperative, Non-suicidal.       Review / Management   Results review:     Labs (Last four charted values)  Glucose              98 (FEB 28)   PT                   10.6 (FEB 28)   INR                  1.0 (FEB 28)   PTT                  28.5 (FEB 28) .    Laboratory Results   Last 5 Days Lab Results : PowerNote Discrete Results   3/1/2018 6:50 CST        WBC                       10.2 x10(3)/mcL                             RBC                       3.75 x10(6)/mcL  LOW                             Hgb                       12.0 gm/dL                             Hct                       39.2 %                             Platelet                  103 x10(3)/mcL  LOW                             MCV                       104 fL  HI                             MCH                       32 pg                             MCHC                      31 gm/dL                             RDW                       14.5 %                             MPV                       11.9 fL  HI                             Abs Neut                  8.7 x10(3)/mcL  HI                             Neutro Auto               86 %  HI                             Lymph Auto                 7.70 %  LOW                             Mono Auto                 6 %                             Eos Auto                  0 %                             Abs Eos                   0.0 x10(3)/mcL                             Basophil Auto             0 %                             Abs Neutro                8.7 x10(3)/mcL  HI                             Abs Lymph                 0.8 x10(3)/mcL                             Abs Mono                  0.7 x10(3)/mcL                             Abs Baso                  0.0 x10(3)/mcL           Impression and Plan   Diagnosis     Acute appendicitis (EQP94-LO K35.80).     AP - Abdominal pain (PNED [id number]).     ESRD on dialysis (VFP43-UD N18.6).     Hypothyroid (ZQG21-PA E03.9).     Schizophrenia (REE70-PU F20.9).       appendicitis - s/p lap appendectomy  per surgery   ESRD : HD per nephrology   resume home medication

## 2022-04-30 NOTE — ED PROVIDER NOTES
Patient:   Dinorah Santos            MRN: [id number]            FIN: [id number]               Age:   59 years     Sex:  Female     :  1960   Associated Diagnoses:   Abdominal pain, acute   Author:   Brinda Lora      Basic Information   Time seen: Date & time 2019 05:20:00.   History source: Patient.   Arrival mode: Ambulance.   History limitation: None.   Additional information: Chief Complaint from Nursing Triage Note : Chief Complaint   2019 4:46 CDT       Chief Complaint           pt brought in by aasi with c/o abdominal cramping after taking laxatives the night before.  .      History of Present Illness   The patient presents with abdominal pain.  The onset was 1  days ago.  The course/duration of symptoms is constant and fluctuating in intensity.  The character of symptoms is crampy and sharp.  The degree at onset was minimal.  The Location of pain at onset was bilateral and abdominal.  The degree at present is moderate.  The Location of pain at present is bilateral and abdominal.  Radiating pain: none. The exacerbating factor is none.  The relieving factor is none.  Therapy today: none.  Risk factors consist of diabetes mellitus, hypertension, obesity none .  Associated symptoms: vomiting, diarrhea none .  Additional history:  none .        Review of Systems   Constitutional symptoms:  Negative except as documented in HPI.   Skin symptoms:  Negative except as documented in HPI.   Eye symptoms:  Negative except as documented in HPI.   ENMT symptoms:  Negative except as documented in HPI.   Respiratory symptoms:  Negative except as documented in HPI.   Cardiovascular symptoms:  Negative except as documented in HPI.   Gastrointestinal symptoms:  Abdominal pain, cramping.    Genitourinary symptoms:  Negative except as documented in HPI.   Musculoskeletal symptoms:  Negative except as documented in HPI.   Neurologic symptoms:  Negative except as documented in HPI.   Psychiatric  symptoms:  Negative except as documented in HPI.   Endocrine symptoms:  Negative except as documented in HPI.   Hematologic/Lymphatic symptoms:  Negative except as documented in HPI.   Allergy/immunologic symptoms:  Negative except as documented in HPI.             Additional review of systems information: All other systems reviewed and otherwise negative.      Health Status   Allergies:    Allergies (1) Active Reaction  No Known Medication Allergies None Documented.   Medications:  (Selected)   Documented Medications  Documented  Aripiprazole 30 Mg Tablet: 30 mg = 1 tab(s), Oral, qPM  Clonazepam 2 Mg Tablet: 2 mg = 1 tab(s), Oral, qPM  Midodrine Hcl 10 Mg Tablet: 10 mg = 1 tab(s), Oral, TID  OLANZapine 10 mg oral tablet: 10 mg = 1 tab(s), Oral, At Bedtime, # 30 tab(s), 0 Refill(s)  Renvela 800 mg oral tablet: 2,400 mg = 3 tab(s), Oral, TID, # 270 tab(s), 0 Refill(s)  aspirin 81 mg oral tablet: 81 mg = 1 tab(s), Oral, Daily, # 90 tab(s), 0 Refill(s)  atorvastatin 20 mg oral tablet: 20 mg = 1 tab(s), Oral, At Bedtime, # 90 tab(s), 0 Refill(s)  divalproex sodium 250 mg oral delayed release tablet: 250 mg = 1 tab(s), Oral, BID, # 270 tab(s), 0 Refill(s)  levothyroxine 175 mcg (0.175 mg) oral tablet: 175 mcg = 1 tab(s), Oral, Daily, # 30 tab(s), 0 Refill(s)  oxcarbazepine 600 mg oral tablet: 600 mg = 1 tab(s), Oral, BID, # 60 tab(s), 0 Refill(s)  traZODone 100 mg oral tablet: 100 mg = 1 tab(s), Oral, At Bedtime, # 180 tab(s), 0 Refill(s)  zolpidem 10 mg oral tablet: 10 mg = 1 tab(s), Oral, Once a day (at bedtime), PRN PRN as needed for sleep, 0 Refill(s).   Immunizations: Include Immunizations   Previous  No previous immunizations have been selected or recorded.   Future  No future immunizations have been selected or recorded. .      Past Medical/ Family/ Social History   Medical history:    Active  ESRD on dialysis ([id number])  Hyperlipidemia ([id number])  Hypothyroid (068737389)  Umbilical  hernia ([id number])  Resolved  CP - Chronic pancreatitis (368530273):  Resolved.  Schizophrenia ([id number]):  Resolved., Reviewed as documented in chart.   Surgical history:    Appendectomy Laparoscopic on 2/28/2018 at 58 Years.  Comments:  2/28/2018 23:13 - Verónica JUSTIN, Berenice  auto-populated from documented surgical case  Knee arthroplasty ([id number]).  Comments:  9/28/2017 06:30 - Allie Aguiar RN  right  c/s.  Breast reduction, bilateral ([id number]).  av graft., Reviewed as documented in chart.   Family history:    Father  Primary malignant neoplasm of colon  Sister  Cancer  Grandmother  Acute myocardial infarction.  , Reviewed as documented in chart.   Social history: Reviewed as documented in chart.   Problem list:    Active Problems (7)  Acute appendicitis   ESRD on dialysis   Hyperlipidemia   Hypothyroid   Kidney polycystic disease   Obesity   Umbilical hernia .      Physical Examination               Vital Signs   Vital Signs   4/11/2019 4:46 CDT       Temperature Temporal Artery               36.8 DegC                             Peripheral Pulse Rate     104 bpm  HI                             Respiratory Rate          20 br/min                             SpO2                      96 %                             Systolic Blood Pressure   90 mmHg                             Diastolic Blood Pressure  60 mmHg  .   General:  Alert, no acute distress, anxious.    Skin:  Warm, dry, intact, normal for ethnicity, numerous scars from previous HD grafts    Skin:  Warm, dry, intact, no pallor, no rash, normal for ethnicity.        Head:  Normocephalic, atraumatic.    Neck:  Supple, trachea midline, no tenderness    Neck:  Supple, trachea midline, no tenderness, no JVD, no carotid bruit.        Eye:  Pupils are equal, round and reactive to light, extraocular movements are intact, normal conjunctiva, vision grossly normal.    Ears, nose, mouth and throat:  Oral mucosa moist    Ears, nose, mouth and throat:   Tympanic membranes clear, oral mucosa moist, no pharyngeal erythema or exudate.        Cardiovascular:  Regular rate and rhythm, 3/6 himanshu    Cardiovascular:  Regular rate and rhythm, No murmur, Normal peripheral perfusion, No edema.        Respiratory:  Lungs are clear to auscultation, respirations are non-labored, breath sounds are equal, Symmetrical chest wall expansion.    Chest wall:  No tenderness, No deformity.    Back:      Back:  Nontender, Normal range of motion, Normal alignment, no step-offs.        Musculoskeletal:      Musculoskeletal:  Normal ROM, normal strength, no tenderness, no swelling, no deformity.        Gastrointestinal:  Soft, Tenderness: Mild, generalized, periumbilical, Guarding: Minimal, voluntary, Rebound: Negative, Bowel sounds: Normal.    Neurological:  Alert and oriented to person, place, time, and situation, No focal neurological deficit observed, CN II-XII intact, normal speech observed    Neurological:  Alert and oriented to person, place, time, and situation, No focal neurological deficit observed, CN II-XII intact, normal sensory observed, normal motor observed, normal speech observed, normal coordination observed.        Lymphatics:  No lymphadenopathy.   Psychiatric:  Cooperative    Psychiatric:  Cooperative, appropriate mood & affect, normal judgment, non-suicidal.           Medical Decision Making   Differential Diagnosis:  Abdominal pain.   Documents reviewed:  Emergency department nurses' notes.   Orders  Launch Order Profile (Selected)   Inpatient Orders  Ordered (Dispatched)  Drug Screen Urine AGH: Stat collect, Urine, 04/11/19 5:07:00 CDT, Stop date 04/11/19 5:07:00 CDT, Nurse collect  Urinalysis with Microscopic if Indicated: Stat collect, Urine, 04/11/19 5:07:00 CDT, Stop date 04/11/19 5:07:00 CDT, Nurse collect  Ordered (Exam Ordered)  XR Abdomen Flat and Erect: Stat, 04/11/19 5:07:00 CDT, Abdominal Pain, None, Wheelchair, Patient Has IV?, Rad Type, Not Scheduled,  04/11/19 5:07:00 CDT  Ordered (In-Lab)  Amylase Level: STAT collect, 04/11/19 5:18:49 CDT, BLOOD, Collected, Stop date 04/11/19 5:18:00 CDT, Lab Collect  BNP-Pro: ROUTINE collect, 04/11/19 5:18:49 CDT, BLOOD, Collected, Stop date 04/11/19 5:18:00 CDT, Lab Collect  CMP: STAT collect, 04/11/19 5:18:49 CDT, BLOOD, Collected, Stop date 04/11/19 5:07:00 CDT, Lab Collect  INR - Protime: NOW collect, 04/11/19 5:18:49 CDT, BLOOD, Collected, Stop date 04/11/19 5:18:00 CDT, Lab Collect  Lipase Level: STAT collect, 04/11/19 5:18:49 CDT, BLOOD, Collected, Stop date 04/11/19 5:18:00 CDT, Lab Collect  TSH: STAT collect, 04/11/19 5:18:49 CDT, BLOOD, Collected, Stop date 04/11/19 5:18:00 CDT, Lab Collect  Completed  Automated Diff: STAT collect, 04/11/19 5:18:00 CDT, Blood, Collected, Once, Stop date 04/11/19 5:18:00 CDT, Lab Collect, 04/11/19 5:07:00 CDT  Bentyl: 20 mg, form: Injection, IM, Once, first dose 04/11/19 5:06:00 CDT, stop date 04/11/19 5:06:00 CDT, STAT  Blood Smear Microscopic Exam: STAT collect, 04/11/19 5:18:00 CDT, Blood, Collected, Once, Stop date 04/11/19 5:18:00 CDT, Lab Collect, 04/11/19 5:07:00 CDT  CBC w/ Auto Diff: STAT collect, 04/11/19 5:18:49 CDT, BLOOD, Collected, Stop date 04/11/19 5:18:00 CDT, Lab Collect  Compazine: 10 mg, form: Injection, IV Push, Once PRN for nausea, first dose 04/11/19 5:06:00 CDT, STAT  Peripheral IV Insertion: 04/11/19 5:07:00 CDT  dicyclomine: 20 mg, 2 mL, Injection, N/A, Once, Stop date 04/11/19 5:06:58 CDT, Physician Stop, 04/11/19 5:06:58 CDT.   Results review:  Lab results : Lab View   4/11/2019 5:18 CDT       Sodium Lvl                143 mmol/L                             Potassium Lvl             2.6 mmol/L  LOW                             Chloride                  103 mmol/L                             CO2                       23 mmol/L                             Calcium Lvl               9.5 mg/dL                             Glucose Lvl               157 mg/dL  HI                              BUN                       17 mg/dL                             Creatinine                4.53 mg/dL  HI                             eGFR-AA                   13 mL/min/1.73 m2  NA                             eGFR-LAN                  10 mL/min/1.73 m2  NA                             Amylase Lvl               73 unit/L                             Bili Total                0.7 mg/dL                             Bili Direct               0.30 mg/dL                             Bili Indirect             0.40 mg/dL  NA                             AST                       18 unit/L                             ALT                       8 unit/L  LOW                             Alk Phos                  181 unit/L  HI                             Total Protein             7.4 gm/dL                             Albumin Lvl               2.9 gm/dL  LOW                             Globulin                  4.50 gm/dL  NA                             A/G Ratio                 0.6 ratio  NA                             NT pro BNP.               6,962 pg/mL  HI                             Lipase Lvl                255 unit/L                             TSH                       5.405 mIU/mL  HI                             PT                        10.7 second(s)                             INR                       1.0                             WBC                       9.4 x10(3)/mcL                             RBC                       3.12 x10(6)/mcL  LOW                             Hgb                       10.0 gm/dL  LOW                             Hct                       33.0 %  LOW                             Platelet                  138 x10(3)/mcL  LOW                             MCV                       106 fL  HI                             MCH                       32 pg                             MCHC                      30 gm/dL  LOW                             RDW                        21.3 %  HI                             MPV                       10.3 fL  HI                             Abs Neut                  6.6 x10(3)/mcL                             Neutro Auto               70 %                             Lymph Auto                19 %                             Mono Auto                 9 %                             Eos Auto                  0 %                             Abs Eos                   0.0 x10(3)/mcL                             Basophil Auto             0 %                             Abs Neutro                6.6 x10(3)/mcL                             Abs Lymph                 1.8 x10(3)/mcL                             Abs Mono                  0.8 x10(3)/mcL                             Abs Baso                  0.0 x10(3)/mcL                             IG%                       1.400 %  HI                             IG#                       0.1300  HI                             Platelet Est              Adequate                             Anisocyte                 Trace                             Poik                      1+                             Macrocyte                 1+                             RBC Morph                 Abnormal    ,    Labs (Last four charted values)  WBC                  9.4 (APR 11)   Hgb                  L 10.0 (APR 11)   Hct                  L 33.0 (APR 11)   Plt                  L 138 (APR 11) .       Reexamination/ Reevaluation   Time: 4/11/2019 07:55:00 .   Vital signs   results included from flowsheet : Vital Signs   4/11/2019 6:38 CDT       Peripheral Pulse Rate     112 bpm  HI                             Respiratory Rate          20 br/min                             SpO2                      95 %                             Oxygen Therapy            Room air                             Systolic Blood Pressure   81 mmHg  LOW                             Diastolic Blood Pressure  62 mmHg                             Mean  Arterial Pressure, Cuff              68 mmHg    4/11/2019 4:46 CDT       Temperature Temporal Artery               36.8 DegC                             Peripheral Pulse Rate     104 bpm  HI                             Respiratory Rate          20 br/min                             SpO2                      96 %                             Systolic Blood Pressure   90 mmHg                             Diastolic Blood Pressure  60 mmHg     Notes: This is Dr. Blayne Thomas adding an addendum.  I assumed care of this patient at 6 AM from .  I agree with his above assessment.  Patient alert and oriented ×4 regular rate and rhythm no murmurs rubs or gallops bilateral breath sounds are clear and equal abdomen mild diffuse tenderness hypoactive bowel sounds no distention.  I spoke with Dr. Connell he agrees to admit patient will consult surgery and nephrology.  Dr. Lazcano states he will consult give patient MiraLAX prep..      Impression and Plan   Diagnosis   Abdominal pain, acute ([id number]-OB R10.9)   Plan   Condition: Stable.    Disposition: Admit time  4/11/2019 08:11:00, Place in Observation Telemetry Unit, Patient care transitioned to: Time: 4/11/2019 06:00:00, Martha MANNING, Blayne WHITTINGTON    Counseled: Patient, Regarding diagnosis, Regarding diagnostic results, Regarding treatment plan, Patient indicated understanding of instructions.    Orders: Launch Orders   Pharmacy:  polyethylene glycol 3350 ( MiraLax for Colonoscopy ) (Order): 238 gm, Oral, Once, first dose 4/11/2019 9:02 CDT, stop date 4/11/2019 9:02 CDT, STAT, See Order Comments for Instructions  Admit/Transfer/Discharge:  Admit to Outpatient Observation (Order): 4/11/2019 9:00 CDT, Telemetry with Monitor Becki MANNING, Alexa Santana  , Launch Orders   Consults:  Consult to Physician (Order): 4/11/2019 9:04 CDT, Jaleesa MANNING, [facility] Abdominal pain possible mesenteric ischemia, No  Consult to Physician (Order): 4/11/2019 9:04 CDT, Myles MANNING, Kaleb MIXON  "Dialysis patient, No  .    Notes: "I, Brinda Loar CMA, am scribing for and in the presence of Dr. Justin."   4/11/2019 0520  .    "

## 2022-04-30 NOTE — ED PROVIDER NOTES
Patient:   Dinorah Santos            MRN: [id number]            FIN: [id number]               Age:   59 years     Sex:  Female     :  1960   Associated Diagnoses:   None   Author:   Martha MANNING, Blayne PLATA M.D. on-call.  I responded to a CODE BLUE.  Found patient in cardiac arrest.  Patient had bradycardia down and gone into asystole.  CPR was in progress.  No pulse noted.  Patient was admitted for possible ischemic bowel, abdominal pain, hypokalemia.  Attempted resuscitation for 30 minutes seen nurse's code sheet patient remained in asystole the entire time I spoke with the family and explained to them due to the patient's history patient's outcome would be very poor.  After 30 minutes it was determined that resuscitation was not successful and resuscitation was stopped.  I spoke with the family they verbalized understanding I also spoke with Dr. Arias.  Time of death was 180.  Patient was intubated by myself see attached intubation note.  I supervised CPR for 30 minutes.

## 2022-04-30 NOTE — ED PROVIDER NOTES
Patient:   Dinorah Santos            MRN: [id number]            FIN: [id number]               Age:   59 years     Sex:  Female     :  1960   Associated Diagnoses:   Hyperkalemia; ESRD on dialysis; CP - Chronic pancreatitis   Author:   Juan M Mcclain MD      Basic Information   Time seen: Date & time 2019 21:40:00.   History source: Patient.   Additional information: Chief Complaint from Nursing Triage Note : Chief Complaint   2019 21:39 CST      Chief Complaint           Pt c/o constipation and vomiting today approx 1700.  Mirlax earlier today. Dialysis yesterday,  6L off per pt.  Hypotensive on arrival.  .      History of Present Illness   The patient presents with constipation and Abdomen cramps after Taking Laxative.  The onset was 4  hours ago.  The course/duration of symptoms is constant.  Character of constipation unable to defecate.  The degree at present is moderate.  The exacerbating factor is none.  The relieving factor is over the counter laxative.  Risk factors consist of Chronic kidney disease on hemodialysis.  Prior episodes: frequent.  Therapy today: laxative(s).  Associated symptoms: abdominal pain.        Review of Systems   Constitutional symptoms:  No fever,    Respiratory symptoms:  No shortness of breath,    Cardiovascular symptoms:  No chest pain,    Gastrointestinal symptoms:  Abdominal pain, moderate, diffuse, cramping.    Genitourinary symptoms:  No dysuria, no hematuria.              Additional review of systems information: All other systems reviewed and otherwise negative.      Health Status   Allergies:    Allergic Reactions (Selected)  No Known Medication Allergies.   Medications:  (Selected)   Inpatient Medications  Ordered  IVF Normal Saline NS Bolus 250ml: 250 mL, 250 mL, IV, 833.33 mL/hr, start date 19 21:53:00 CST, stop date 19 21:53:00 CST, STAT  Documented Medications  Documented  Aripiprazole 30 Mg Tablet: 30 mg = 1 tab(s), Oral,  qPM  Clonazepam 2 Mg Tablet: 2 mg = 1 tab(s), Oral, qPM  Docu Soft 100 mg oral capsule: 100 mg = 1 cap(s), Oral, BID, PRN PRN for constipation, 0 Refill(s)  OLANZapine 10 mg oral tablet: 10 mg = 1 tab(s), Oral, At Bedtime, # 30 tab(s), 0 Refill(s)  Renvela 800 mg oral tablet: 2,400 mg = 3 tab(s), Oral, TID, # 270 tab(s), 0 Refill(s)  Sensipar 30 mg oral tablet: 30 mg = 1 tab(s), Oral, Daily, # 30 tab(s), 0 Refill(s)  aspirin 81 mg oral tablet: 81 mg = 1 tab(s), Oral, Daily, # 90 tab(s), 0 Refill(s)  atorvastatin 20 mg oral tablet: 20 mg = 1 tab(s), Oral, At Bedtime, # 90 tab(s), 0 Refill(s)  clonazePAM 1 mg oral tablet: 1 mg = 1 tab(s), Oral, Daily, 0 Refill(s)  divalproex sodium 250 mg oral delayed release tablet: 250 mg = 1 tab(s), Oral, BID, # 270 tab(s), 0 Refill(s)  levothyroxine 175 mcg (0.175 mg) oral tablet: 175 mcg = 1 tab(s), Oral, Daily, # 30 tab(s), 0 Refill(s)  oxcarbazepine 600 mg oral tablet: 600 mg = 1 tab(s), Oral, BID, # 60 tab(s), 0 Refill(s)  traZODone 100 mg oral tablet: 100 mg = 1 tab(s), Oral, At Bedtime, # 180 tab(s), 0 Refill(s)  zolpidem 10 mg oral tablet: 10 mg = 1 tab(s), Oral, Once a day (at bedtime), PRN PRN as needed for sleep, 0 Refill(s).      Past Medical/ Family/ Social History   Medical history:    Active  ESRD on dialysis (SNOMED CT [id number])  Hypothyroid (SNOMED CT 435063212)  Hyperlipidemia (SNOMED CT 29603426)  Umbilical hernia (SNOMED CT 7709060587)  Resolved  Schizophrenia (SNOMED CT 01665511):  Resolved..   Surgical history:    Appendectomy Laparoscopic on 2/28/2018 at 58 Years.  Comments:  2/28/2018 23:13 - Verónica JUSTIN, Berenice  auto-populated from documented surgical case  Knee arthroplasty ([id number]).  Comments:  9/28/2017 06:30 - Allie Aguiar RN  right  c/s.  Breast reduction, bilateral ([id number]).  av graft..   Family history:    Primary malignant neoplasm of colon  Father  Cancer  Sister  Acute myocardial infarction.  Grandmother  .    Problem list:    Active Problems (6)  Acute appendicitis   ESRD on dialysis   Hyperlipidemia   Hypothyroid   Obesity   Umbilical hernia .      Physical Examination               Vital Signs   Vital Signs   1/22/2019 22:19 CST      Peripheral Pulse Rate     98 bpm                             Respiratory Rate          18 br/min                             SpO2                      97 %                             Oxygen Therapy            Room air                             Systolic Blood Pressure   80 mmHg  LOW                             Diastolic Blood Pressure  50 mmHg  LOW    1/22/2019 21:39 CST      Temperature Temporal Artery               36.2 DegC  LOW                             Peripheral Pulse Rate     92 bpm                             Respiratory Rate          22 br/min                             SpO2                      95 %                             Oxygen Therapy            Room air                             Systolic Blood Pressure   77 mmHg  LOW                             Diastolic Blood Pressure  48 mmHg  LOW  .   Measurements   1/22/2019 21:39 CST      Weight Dosing             98 kg                             Weight Measured and Calculated in Lbs     216.05 lb                             Weight Estimated          98 kg                             Height/Length Dosing      164 cm                             Height/Length Measured    164 cm                             Height/Length Estimated   164 cm                             Body Mass Index Estimated 36.44 kg/m2  .   General:  Alert, no acute distress.    Skin:  Intact.   Head:  Atraumatic.   Neck:  Supple.   Ears, nose, mouth and throat:  Oral mucosa moist.   Cardiovascular:  Regular rate and rhythm, No murmur, Normal peripheral perfusion.    Respiratory:  Lungs are clear to auscultation, respirations are non-labored, breath sounds are equal, Symmetrical chest wall expansion.    Gastrointestinal:  Soft, Tenderness: Mild, epigastric,  Guarding: Negative, Rebound: Negative, Bowel sounds: Normal.    Musculoskeletal:  Normal ROM.   Neurological:  Alert and oriented to person, place, time, and situation.   Psychiatric:  Cooperative.      Medical Decision Making   Documents reviewed:  Emergency department nurses' notes.   Orders  Launch Order Profile (Selected)   Inpatient Orders  Ordered  IVF Normal Saline NS Bolus 250ml: 250 mL, 250 mL, IV, 833.33 mL/hr, start date 01/22/19 21:53:00 CST, stop date 01/22/19 21:53:00 CST, STAT  Possible Sepsis: 01/22/19 21:56:32 CST, Once  Saline Lock Insert: 01/22/19 21:52:00 CST, Once-NOW, Stop date 01/22/19 21:52:00 CST  Completed  Automated Diff: Stat collect, 01/22/19 21:47:00 CST, Blood, Collected, Stop date 01/22/19 21:47:00 CST, Lab Collect, Print Label By Order Location, 01/22/19 21:52:00 CST  Blood Smear Microscopic Exam: Stat collect, 01/22/19 21:47:00 CST, Blood, Collected, Stop date 01/22/19 21:47:00 CST, Lab Collect, Print Label By Order Location, 01/22/19 21:52:00 CST  CBC w/ Auto Diff: Stat collect, 01/22/19 21:52:00 CST, Blood, Stop date 01/22/19 21:54:00 CST, Lab Collect, Print Label By Order Location, 01/22/19 21:52:00 CST  CMP: Stat collect, 01/22/19 21:52:00 CST, Blood, Once, Stop date 01/22/19 21:54:00 CST, Lab Collect, Print Label By Order Location, 01/22/19 21:52:00 CST  CT Abdomen and Pelvis W/O Contrast: Stat, 01/22/19 21:52:00 CST, Abdominal Pain, None, Wheelchair, Patient Has IV?, Rad Type, Schedule this test, 01/22/19 21:52:00 CST  Estimated Glomerular Filtration Rate: Stat collect, 01/22/19 21:47:00 CST, Blood, Collected, Once, Stop date 01/22/19 21:47:00 CST, Lab Collect, Print Label By Order Location, 01/22/19 21:52:00 CST  Lipase Level: Stat collect, 01/22/19 21:52:00 CST, Blood, Stop date 01/22/19 21:54:00 CST, Lab Collect, 01/22/19 21:52:00 CST  .   Results review:  Lab results : Lab View   1/22/2019 21:47 CST      Sodium Lvl                137 mmol/L                              Potassium Lvl             6.1 mmol/L  CRIT                             Chloride                  96 mmol/L  LOW                             CO2                       23 mmol/L                             Calcium Lvl               10.8 mg/dL  HI                             Glucose Lvl               115 mg/dL                             BUN                       77 mg/dL  HI                             Creatinine                10.99 mg/dL  CRIT                             eGFR-AA                   5 mL/min/1.73 m2  NA                             eGFR-LAN                  4 mL/min/1.73 m2  NA                             Bili Total                0.5 mg/dL                             Bili Direct               0.20 mg/dL                             Bili Indirect             0.30 mg/dL  NA                             AST                       32 unit/L                             ALT                       49 unit/L  HI                             Alk Phos                  160 unit/L  HI                             Total Protein             8.6 gm/dL  HI                             Albumin Lvl               4.2 gm/dL                             Globulin                  4.40 gm/dL  NA                             A/G Ratio                 1.0 ratio  NA                             Lipase Lvl                694 unit/L  HI                             WBC                       11.9 x10(3)/mcL  HI                             RBC                       3.68 x10(6)/mcL  LOW                             Hgb                       12.2 gm/dL                             Hct                       39.8 %                             Platelet                  114 x10(3)/mcL  LOW                             MCV                       108 fL  HI                             MCH                       33 pg                             MCHC                      31 gm/dL                             RDW                       15.9 %                              MPV                       11.8 fL  HI                             Abs Neut                  8.6 x10(3)/mcL  HI                             Neutro Auto               72 %                             Lymph Auto                18 %                             Mono Auto                 9 %                             Abs Neutro                8.6 x10(3)/mcL  HI                             Abs Lymph                 2.1 x10(3)/mcL                             Abs Mono                  1.1 x10(3)/mcL                             IG%                       0.500 %  HI                             IG#                       0.0600  HI                             Platelet Est              Decreased                             Macrocyte                 1+    .   Radiology results:  Rad Results (ST)  < 12 hrs   Accession: [id number]  Order: CT Abdomen and Pelvis W/O Contrast  Report Dt/Tm: 01/22/2019 22:26  Report:      CT abdomen pelvis without contrast     Technique: Images of the abdomen and pelvis were obtained without  contrast.     Total DLP: 1808.8 mGy cm      Indication: Abdominal pain.     Comparison: CT abdomen pelvis 2/28/2018.     Findings:      There is minimal atelectasis in the bilateral lung bases. The heart is  normal in size.     The liver is homogeneous in attenuation. The gallbladder, spleen,  kidneys, and adrenal glands are normal. There are innumerable simple  renal cysts bilaterally. There are multiple subcentimeter  nonobstructing calyceal renal stones bilaterally. There is no  urolithiasis.     The stomach and small bowel are decompressed. The appendix is  surgically absent. The colon is normal. The uterus and adnexa are  normal for age. The urinary bladder is decompressed. There is a small  fat-containing umbilical hernia. There is no pelvic or retroperitoneal  lymphadenopathy. The aorta is nonaneurysmal. There is no lytic or  blastic osseous lesion. There is grade 2 anterolisthesis of L5 on  S1  which is unchanged.       Impression:     1. No acute abnormality of the abdomen and pelvis.  2. Minimal atelectasis in the bilateral lung bases.  3. Findings consistent with polycystic kidney disease.  4. Multiple punctate subcentimeter calyceal renal stones bilaterally.  5. Small fat-containing umbilical hernia.      .      Reexamination/ Reevaluation   Time: 1/22/2019 23:01:00 .   Vital signs   results included from flowsheet : Vital Signs   1/22/2019 22:48 CST      Peripheral Pulse Rate     93 bpm                             Respiratory Rate          20 br/min                             SpO2                      98 %                             Oxygen Therapy            Room air                             Systolic Blood Pressure   77 mmHg  LOW                             Diastolic Blood Pressure  54 mmHg  LOW     Interventions: PowerOrders   Pharmacy:  Sodium Chloride 0.9% intravenous solution (IVF Normal Saline NS Bolus 250ml) (Order): 250 mL, IV, start date 1/22/2019 23:01 CST, stop date 1/22/2019 23:01 CST  , Order Profile (Selected)   Inpatient Orders  Completed  sodium bicarbonate: 50 mEq, form: Injection, IV, AdHoc, first dose 01/22/19 22:50:00 CST, stop date 01/22/19 22:50:00 CST  Order  calcium gluconate 10% inj.(Push/IVPB): 1 gm, IV Piggyback, Once, Infuse over: 30 min, first dose 1/22/2019 23:02 CST, stop date 1/22/2019 23:02 CST, STAT  .   Vital signs   results included from flowsheet : Vital Signs   1/23/2019 0:33 CST       Peripheral Pulse Rate     88 bpm                             SpO2                      96 %                             Oxygen Therapy            Room air                             Systolic Blood Pressure   92 mmHg                             Diastolic Blood Pressure  62 mmHg     Time: 1/23/2019 00:53:00 .   Vital signs   results included from flowsheet : Vital Signs   1/23/2019 1:38 CST       Peripheral Pulse Rate     95 bpm                             Respiratory Rate           20 br/min  (Modified)                            SpO2                      98 %                             Oxygen Therapy            Room air                             Systolic Blood Pressure   87 mmHg  LOW                             Diastolic Blood Pressure  53 mmHg  LOW     Interventions: PowerOrders   Pharmacy:  Reglan (Order): 10 mg, form: Injection, IV Push, Once-NOW, first dose 1/23/2019 0:54 CST, stop date 1/23/2019 0:54 CST, STAT  , Order Profile (Selected)   Inpatient Orders  Completed  Sodium Chloride 0.9% intravenous solution: 250 mL, IV, start date 01/23/19 1:02:00 CST, stop date 01/23/19 1:02:00 CST  .   Notes: Patient usually has low blood pressure, today she is complaining of abdominal pain, she does have chronic pancreatitis, she says she is having problems with constipation and took some laxative because the problem.    Patient is awake, alert, oriented ×3.  Denies any lightheadedness or dizziness, but    Decided to give IV fluids, after 1 L.  Blood pressure is in the same range, I am going to give her some more IV fluids, as    Patient has elevated potassium, I have given her bicarb and calcium and I am waiting for her blood pressure to come up so that she can be admitted for dialysis in the morning..   Time: 1/23/2019 03:08:00 .   Vital signs   results included from flowsheet : Vital Signs   1/23/2019 2:44 CST       Peripheral Pulse Rate     88 bpm                             Respiratory Rate          18 br/min                             SpO2                      100 %                             Oxygen Therapy            Room air                             Systolic Blood Pressure   80 mmHg  LOW                             Diastolic Blood Pressure  50 mmHg  LOW     Interventions: PowerOrders   Pharmacy:  midodrine (Order): 10 mg, form: Tab, Oral, Once-NOW, first dose 1/23/2019 3:07 CST, stop date 1/23/2019 3:07 CST, STAT  , PowerOrders   Pharmacy:  Kayexalate (Order): 30 gm, form:  Susp, Oral, Once-NOW, first dose 1/23/2019 3:16 CST, stop date 1/23/2019 3:16 CST, STAT  .   Notes: Patient is doing better, I am going to give her her dose of midodrine and admit her for dialysis since her potassium is elevated..   Time: 1/23/2019 03:18:00 .   Interventions: PowerOrders   Consults:  Nephrology Service Consult (Order): 1/23/2019 3:18 CST, Hyperkalemia, Need Dialysis, Kaleb Bueno MD      Procedure   Critical care note   Total time: 40 minutes spent engaged in work directly related to patient care and/ or available for direct patient care.   Associated risk factors: hypotension.   Performed by: self.      Impression and Plan   Diagnosis   Hyperkalemia (LQH84-JI E87.5)   ESRD on dialysis (EDQ73-IB N18.6)   CP - Chronic pancreatitis (DDC51-FT K86.1)      Calls-Consults   -  1/23/2019 03:15:00 , Clarence Evans MD, recommends OK to admit, call Nephrologist.    -  1/23/2019 03:30:00 , Kaleb Bueno MD, recommends We will dialyze patient..    Plan   Condition: Stable.    Disposition: Admit time  1/23/2019 03:51:00, Place in Observation Unit.    Counseled: Patient, Family.    Orders: Launch Orders   Admit/Transfer/Discharge:  Admit to Outpatient Observation (Order): 1/23/2019 3:17 CST, Clarence Evans MD Medical Unit, No, Hyperkalemia, chronic pancreatitis, chronic kidney failure needing dialysis.  .

## 2022-04-30 NOTE — ED PROVIDER NOTES
Patient:   Dinorah Santos            MRN: [id number]            FIN: [id number]               Age:   59 years     Sex:  Female     :  1960   Associated Diagnoses:   None   Author:   Blayne Thomas MD      Procedure   Endotracheal intubation   Time: 2019 17:40:00 .    Confirmed: Patient, procedure, and site correct.    Consent: Emergent.    Indication: Respiratory failure.    Airway assessment: Mallampati class: II-soft palate, fauces, portion of uvula.    Procedural sedation: None.    Monitoring: See nurse's notes.    Preparation: Pre oxygenated, Inline stabilization of cervical spine maintained, Ensured proper cuff inflation.    Technique: Oral intubation: A 7.5 ET tube was inserted, using a curved blade, Secured: measures 21 cm at the lips.    Confirmation of tube placement: Bilateral chest rise, Positive color change indicated on end tidal CO2.    Post procedure exam: Equal breath sounds, 02 sat 100 %, No epigastric breath sounds.    Complications: None.    Performed by: Self.    Total time: 5 minutes.

## 2022-05-04 NOTE — HISTORICAL [facility] CERNER
This is a historical note converted from Paramjit. Formatting and pictures may have been removed.  Please reference Paramjit for original formatting and attached multimedia. Admit and Discharge Dates  Admit Date: 01/22/2019  Discharge Date: 01/23/2019  ?  Physicians  Attending Physician - Cristina MANNING, Clarence REESE  Admitting Physician - Cristina MANNING, Clarence REESE  Consulting Physician - Myles MANNING, Kaleb MIXON  Consulting Physician - Cristina MANNING, Clarence REESE  Primary Care Physician - No PCP, No  ?  Discharge Diagnosis  Constipation?[id number]  CP - Chronic pancreatitis?K86.1  ESRD on dialysis?N18.6  Hyperkalemia?E87.5  Kidney polycystic disease?Q61.3  Vomiting?[id number]  Surgical Procedures  No procedures recorded for this visit.  Immunizations  No immunizations recorded for this visit.  ?  Hospital Course  58yo female admitted for abdominal pain with one episode of vomiting.? she does state chronic constipation as well.? CT scan was obtained due to elevated lipase which did not reveal any acute pathology.? Her K was 6.1 therefore she was admitted for HD and further monitoring.? No EKG changes.? She was placed on bentyl for her abdominal cramping.? The pain resolved and she was placed on HD today.? She states that she feels good and is ready to go home.? she is stable for discharge home today.  Time Spent on discharge  D/C=34mins  Objective  Vitals & Measurements  T:?37.0? ?C (Oral)? TMIN:?36.2? ?C (Temporal Artery)? TMAX:?37.1? ?C (Oral)? HR:?57(Monitored)? RR:?20? BP:?116/60? SpO2:?96%? WT:?98?kg?  Physical Exam  General: ?Alert and oriented, No acute distress. ?  ??????? ? Appearance: Well nourished. ?  ??????? ? Behavior: Appropriate. ?  ??????? ? Skin: Normal for ethnicity. ?  ?????Eye: ?Pupils are equal, round and reactive to light, Extraocular movements are intact. ?  ?????HENT: ?Normocephalic, Normal hearing, Oral mucosa is moist, No pharyngeal erythema. ?  ?????Neck: ?Supple, Non-tender,  No carotid bruit, No jugular venous distention, No lymphadenopathy, No thyromegaly. ?  ?????Respiratory: ?Lungs are clear to auscultation, Breath sounds are equal, No chest wall tenderness. ?  ?????Cardiovascular: ?Normal rate, Regular rhythm, No murmur, No gallop, Good pulses equal in all extremities, Normal peripheral perfusion, No edema. ?  ?????Gastrointestinal: ?Soft, Non-tender, Non-distended, Normal bowel sounds, No organomegaly. ?obese  ?????Genitourinary: ?No costovertebral angle tenderness, No urethral discharge. ?  ?????Lymphatics: ?No lymphadenopathy neck, axilla, groin. ?  ?????Musculoskeletal: ?Normal range of motion, Normal strength, No tenderness, No swelling, Normal gait. ?  ?????Integumentary: ?Warm, Dry, Pink, Intact, No rash. ?  ?????Neurologic: ?Alert, Oriented, Normal sensory, Normal motor function, No focal deficits, Cranial Nerves II-XII are grossly intact. ?  ?????Psychiatric: ?Cooperative, Appropriate mood & affect, Normal judgment. ?  Patient Discharge Condition  stable  Discharge Disposition  home   Discharge Medication Reconciliation  Prescribed  clonazePAM (clonazePAM 1 mg oral tablet)?2 mg, Oral, qPM  clonazePAM (clonazePAM 1 mg oral tablet)?1 mg, Oral, Daily  midodrine (midodrine 5 mg oral tablet)?10 mg, Oral, TID  Continue  OLANZapine (OLANZapine 10 mg oral tablet)?10 mg, Oral, At Bedtime  OXcarbazepine (oxcarbazepine 600 mg oral tablet)?600 mg, Oral, BID  aripiprazole (Aripiprazole 30 Mg Tablet)?30 mg, Oral, qPM  aspirin (aspirin 81 mg oral tablet)?81 mg, Oral, Daily  atorvastatin (atorvastatin 20 mg oral tablet)?20 mg, Oral, At Bedtime  cinacalcet (Sensipar 30 mg oral tablet)?30 mg, Oral, Daily  clonazePAM (Clonazepam 2 Mg Tablet)?2 mg, Oral, qPM  clonazePAM (clonazePAM 1 mg oral tablet)?1 mg, Oral, Daily  divalproex sodium (divalproex sodium 250 mg oral delayed release tablet)?250 mg, Oral, BID  docusate (Docu Soft 100 mg oral capsule)?100 mg, Oral, BID, PRN for  constipation  levothyroxine (levothyroxine 175 mcg (0.175 mg) oral tablet)?175 mcg, Oral, Daily  midodrine (Midodrine Hcl 10 Mg Tablet)?10 mg, Oral, TID  sevelamer (Renvela 800 mg oral tablet)?2,400 mg, Oral, TID  traZODone (traZODone 100 mg oral tablet)?100 mg, Oral, At Bedtime  zolpidem (zolpidem 10 mg oral tablet)?10 mg, Oral, Once a day (at bedtime), PRN as needed for sleep  ?  Education and Orders Provided  Discharge Diet - Renal Meals on Dialysis?  Discharge - 01/23/19 16:32:00 CST, Home?  Discharge Activity - Activity as Tolerated?  ?  Follow up  HD as scheduled  pcp in 1-2 weeks  ?

## 2022-05-04 NOTE — HISTORICAL [facility]ERNER
This is a historical note converted from Paramjit. Formatting and pictures may have been removed.  Please reference Paramjit for original formatting and attached multimedia. Admit and Discharge Dates  Admit Date: 04/11/2019  Discharge Date: 04/11/2019  ?  Physicians  Attending Physician - Becki MANNING, Shashank HOPPER  Admitting Physician - Becki MANNING, Shashank HOPPER  Consulting Physician - Myles MANNING, Kaleb MIXON  Consulting Physician - Jaleesa MANNING, [facility]  Primary Care Physician - No PCP, No  ?  Discharge Diagnosis  1.?Abdominal pain, acute?R10.9  2.?Lactic acidosis?E87.2  3.?ESRD on dialysis?N18.6  4.?Hypothyroid?E03.9  5.?Hyperlipidemia?E78.5  Surgical Procedures  No procedures recorded for this visit.  Immunizations  No immunizations recorded for this visit.  ?  Admission Information  59-year-old female admitted for?abdominal pain  Hospital Course  59-year-old female with past medical history of ESRD on hemodialysis?was admitted to the floor?for a?2-day history of worsening abdominal pain.? It was preceded by?laxative use for constipation.? She said the pain was?cramping in nature.? Her workup in the ED was significant for lactic acid 4.5, potassium of 2.6, a white count of 9.4 and CT scan showing?distended loops of large bowel. ?She admitted to the floor where general surgery and nephrology were consulted.? She was manually disimpacted by general surgery?and started on?electrolyte repletion.? At 1735?patient went to cardiac arrest.? ED physician?responded to code?where patient was intubated and?ACLS was performed for 30 minutes.? Patients rhythm did not change from asystole?and she was pronounced dead at 1805?by ED physician.  Significant Findings  (04/11/2019 06:30 CDT CT Abdomen and Pelvis W/O Contrast)  Reason For Exam  Abdominal Pain  ?  Radiology Report  CT ABDOMEN AND PELVIS WITHOUT CONTRAST:  ?  HISTORY: Abdominal Pain ? ??  ?  PATIENT RADIATION DOSE: ?CTDI vol(mGy) 35.70  ?? ? ? ? ? ? ? ? ? ? ? ? ? ? ? ? ? ? ? ? ? ?  ? DLP(mGycm) ?1787.40?  ?  As per PQRS measures, all CT scans at this facility used dose  modulation, iterative reconstruction, and/or weight based dose  adjustment when appropriate to reduce radiation dose to as low as  reasonably achievable.  ?  COMPARISON: 1/22/2019  ?  FINDINGS: Serial axial images were obtained through the abdomen and  pelvis without the administration of ?IV contrast. Coronal and  sagittal reconstructions were also obtained. The heart is normal in  size. Atherosclerosis is seen within the aorta and branching vessels.  There is a suspect stents at the superior vena cava. Calcified lymph  node is seen within the subcarinal region. There is gaseous distention  of the distal esophagus/small hiatus hernia mild hazy groundglass  interstitial markings are evident at the left. Lung bases.  Degenerative changes and mild curvature are noted to the thoracolumbar  spine. There is anterior listhesis of L5 on S1 with bilateral pars  defect evident at L5; not significantly changed. There is interim  development of extensive linear gas collections throughout the  liver/portal vasculature since the prior exam. Gas is seen within the  portal vein and superior mesenteric vein. Gas distended loops of large  bowel are identified with intramural gas scattered throughout.  Scattered blebs of gas are seen outside of bowel lumen within the  peritoneal fat. The spleen and pancreas are grossly within normal  limits without the administration of IV contrast. There is mild  thickening of the right adrenal gland. The kidneys are diminutive in  size with multiple round low-attenuation foci scattered throughout  compatible with multiple cysts. Scattered small punctate  calcifications evident at the kidneys bilaterally . No hydronephrosis  is seen. A right femoral line is present with the tip at the inferior  vena cava. ?A small fatty umbilical hernia is present. No significant  free fluid collection is identified. There is  trace free fluid at the  posterior lower pelvis. The uterus is grossly normal in size. The  bladder is nondistended.  ?  ?IMPRESSION:  1. Gas-distended loops of large bowel with interim mural gas present.  There is extensive gas within the liver/portal vasculature. Scattered  blebs of free air is seen throughout/pneumoperitoneum. Ischemic bowel  must be considered. Clinical correlation is indicated  2. Diminutive atrophic kidneys with multiple cysts and punctate  calcifications  3. Borderline cardiomegaly  4. Right femoral line  5. Thoracolumbar spondylosis with grade 2 anterior listhesis of L5 on  S1 and bilateral pars defect at L5  ? [1]  Time Spent on discharge  27 minutes  Objective  Vitals & Measurements  Not applicable  Physical Exam  GEN: patient unresponsive to voice or noxious stimuli  Eyes: bilateral pupils dilated, non-responsive to light, no bilateral conjunctival reflex present.  CV: Cardiac monitor shows asystole. No heart sounds auscultated, no carotid, radial, or femoral pulse present.  RESP:? No rise or fall of chest. No spontaneous breath sounds present.  ?  Patient Discharge Condition    Discharge Disposition     Discharge Medication Reconciliation  Continue  OLANZapine (OLANZapine 10 mg oral tablet)?10 mg, Oral, At Bedtime  OXcarbazepine (oxcarbazepine 600 mg oral tablet)?600 mg, Oral, BID  aripiprazole (Aripiprazole 30 Mg Tablet)?30 mg, Oral, qPM  aspirin (aspirin 81 mg oral tablet)?81 mg, Oral, Daily  atorvastatin (atorvastatin 20 mg oral tablet)?20 mg, Oral, At Bedtime  clonazePAM (Clonazepam 2 Mg Tablet)?2 mg, Oral, qPM  divalproex sodium (divalproex sodium 250 mg oral delayed release tablet)?250 mg, Oral, BID  levothyroxine (levothyroxine 175 mcg (0.175 mg) oral tablet)?175 mcg, Oral, Daily  midodrine (Midodrine Hcl 10 Mg Tablet)?10 mg, Oral, TID  sevelamer (Renvela 800 mg oral tablet)?2,400 mg, Oral, TID  traZODone (traZODone 100 mg oral tablet)?100 mg, Oral, At  Bedtime  zolpidem (zolpidem 10 mg oral tablet)?10 mg, Oral, Once a day (at bedtime), PRN as needed for sleep  ?     [1]?CT Abdomen and Pelvis W/O Contrast; Brian MANNING, Jessee Irvin 04/11/2019 06:30 CDT

## 2022-05-04 NOTE — HISTORICAL [facility]
This is a historical note converted from Paramjit. Formatting and pictures may have been removed.  Please reference Paramjit for original formatting and attached multimedia. Chief Complaint  Pt c/o constipation and vomiting today approx 1700. Mirlax earlier today. Dialysis yesterday, 6L off per pt. Hypotensive on arrival.  History of Present Illness  60yo female presents to the ED c/o abdominal pain due to constipation.? She does have a h/o chronic constipation.? She is also on HD for ESRD.? She did go on Monday.? She states an episode of vomiting as well.? Lab work did reveal and elevated K of 6.1.? Lipase was also elevated.? CT scan of abdomin was obtained which did not reveal any acute pathology.? She does have hypotension requiring midodrine which is also chronic.? She will be admitted due to her K being elevated.? The pain was described as? cramping and diffuse.? The pain was rated at a 6/10 at is worse.? She will be placed on H due to her hyperkalemia.  Review of Systems  ?  ?????Constitutional: ?No fever, No chills, No sweats,?+ weakness, No fatigue. ?  ?????Eye: ?No double vision, No dry eyes, No photophobia. ?  ?????Ear/Nose/Mouth/Throat: ?No ear pain, No nasal congestion, No sore throat. ?  ?????Respiratory: ?No shortness of breath, No cough, No sputum production, No hemoptysis, No wheezing, No pleuritic pain. ?  ?????Cardiovascular: ?No chest pain, No palpitations, No peripheral edema, No syncope. ?  ?????Gastrointestinal:??+ nausea, +vomiting, No diarrhea, No constipation, No heartburn,?+ abdominal pain. ?  ?????Genitourinary: ?No dysuria, No hematuria, No change in urine stream. ?  ?????Hematology/Lymphatics: ?No anemia, No bruising tendency, No bruise, No hemorrhage, No petechiae. ?  ?????Endocrine: ?No excessive thirst, No polyuria, No cold intolerance. ?  ?????Immunologic: ?No recurrent fevers, No recurrent infections. ?  ?????Musculoskeletal: ?Joint pain, No back pain, No muscle pain, No decreased range  of motion. ?  ?????Integumentary: ?No rash, No pruritus, No petechiae, No skin lesion. ?  ?????Neurologic: ?Alert and oriented X4, No abnormal balance, No confusion, No tingling. ?  ?????Psychiatric: ?No anxiety, No depression. ?  Physical Exam  Vitals & Measurements  T:?37.1? ?C (Oral)? TMIN:?36.2? ?C (Temporal Artery)? TMAX:?37.1? ?C (Oral)? HR:?102(Monitored)? RR:?20? BP:?113/62? SpO2:?96%? WT:?98?kg?  General: ?Alert and oriented, No acute distress. ?  ??????? ? Appearance: Well nourished. ?  ??????? ? Behavior: Appropriate. ?  ??????? ? Skin: Normal for ethnicity. ?  ?????Eye: ?Pupils are equal, round and reactive to light, Extraocular movements are intact. ?  ?????HENT: ?Normocephalic, Normal hearing, Oral mucosa is moist, No pharyngeal erythema. ?  ?????Neck: ?Supple, Non-tender, No carotid bruit, No jugular venous distention, No lymphadenopathy, No thyromegaly. ?  ?????Respiratory: ?Lungs are clear to auscultation, Breath sounds are equal, No chest wall tenderness. ?  ?????Cardiovascular: ?Normal rate, Regular rhythm, No murmur, No gallop, Good pulses equal in all extremities, Normal peripheral perfusion, No edema. ?  ?????Gastrointestinal: ?Soft, Non-tender, Non-distended, Normal bowel sounds, No organomegaly. ?obese  ?????Genitourinary: ?No costovertebral angle tenderness, No urethral discharge. ?  ?????Lymphatics: ?No lymphadenopathy neck, axilla, groin. ?  ?????Musculoskeletal: ?Normal range of motion, Normal strength, No tenderness, No swelling, Normal gait. ?  ?????Integumentary: ?Warm, Dry, Pink, Intact, No rash. ?  ?????Neurologic: ?Alert, Oriented, Normal sensory, Normal motor function, No focal deficits, Cranial Nerves II-XII are grossly intact. ?  ?????Psychiatric: ?Cooperative, Appropriate mood & affect, Normal judgment. ?  Assessment/Plan  Constipation?[id number]  CP - Chronic pancreatitis?K86.1  ESRD on dialysis?N18.6  Hyperkalemia?E87.5  Kidney polycystic  disease?Q61.3  Vomiting?[id number]  will admit for observation for HD and her abdominal pain.  follow labs  DVT prophylaxis  nephrology consulted for HD  placed on Bentyl for cramping  If stable after HD then will discharge home.  review home meds  ?   Problem List/Past Medical History  Ongoing  Acute appendicitis  ESRD on dialysis  Hyperlipidemia  Hypothyroid  Kidney polycystic disease  Obesity  Umbilical hernia  Historical  CP - Chronic pancreatitis  Schizophrenia  Procedure/Surgical History  Appendectomy Laparoscopic (02/28/2018)  av graft  Breast reduction, bilateral  c/s  Knee arthroplasty  Laparoscopy, surgical, appendectomy   Medications  Inpatient  aspirin 81 mg oral tablet, CHEWABLE, 81 mg= 1 tab(s), Oral, Daily  Bentyl, 10 mg= 1 cap(s), Oral, TID  clonazePAM, 2 mg= 2 tab(s), Oral, qPM  clonazePAM 1 mg oral tablet, 1 mg= 1 tab(s), Oral, Daily  divalproex  mg (DR) oral delayed release (EC) tablet, 250 mg= 1 tab(s), Oral, BID  levothyroxine 175 mcg (0.175 mg) oral tablet, 175 mcg= 3.5 tab(s), Oral, Daily  Lovenox, 30 mg= 0.3 mL, Subcutaneous, Daily  midodrine, 10 mg= 2 tab(s), Oral, TID  OLANZapine 10 mg oral tablet, 10 mg= 1 tab(s), Oral, At Bedtime  oxcarbazepine 600 mg oral tablet, 600 mg= 2 tab(s), Oral, BID  Protonix 40 mg ORAL enteric coated tablet, 40 mg= 1 tab(s), Oral, Daily  Renvela 800 mg oral tablet, 2400 mg= 3 tab(s), Oral, TID  Sensipar, 30 mg= 1 tab(s), Oral, Daily  Zofran, 4 mg= 2 mL, IV Push, q4hr, PRN  Home  Aripiprazole 30 Mg Tablet, 30 mg= 1 tab(s), Oral, qPM,? ?Investigating  aspirin 81 mg oral tablet, 81 mg= 1 tab(s), Oral, Daily,? ?Investigating  atorvastatin 20 mg oral tablet, 20 mg= 1 tab(s), Oral, At Bedtime,? ?Investigating  clonazePAM 1 mg oral tablet, 1 mg= 1 tab(s), Oral, Daily,? ?Investigating  Clonazepam 2 Mg Tablet, 2 mg= 1 tab(s), Oral, qPM,? ?Investigating  divalproex sodium 250 mg oral delayed release tablet, 250 mg= 1 tab(s), Oral, BID,?  ?Investigating  Docu Soft 100 mg oral capsule, 100 mg= 1 cap(s), Oral, BID, PRN,? ?Investigating  levothyroxine 175 mcg (0.175 mg) oral tablet, 175 mcg= 1 tab(s), Oral, Daily,? ?Investigating  Midodrine Hcl 10 Mg Tablet, 10 mg= 1 tab(s), Oral, TID  OLANZapine 10 mg oral tablet, 10 mg= 1 tab(s), Oral, At Bedtime,? ?Investigating  oxcarbazepine 600 mg oral tablet, 600 mg= 1 tab(s), Oral, BID,? ?Investigating  Renvela 800 mg oral tablet, 2400 mg= 3 tab(s), Oral, TID,? ?Investigating  Sensipar 30 mg oral tablet, 30 mg= 1 tab(s), Oral, Daily,? ?Investigating  traZODone 100 mg oral tablet, 100 mg= 1 tab(s), Oral, At Bedtime,? ?Investigating  zolpidem 10 mg oral tablet, 10 mg= 1 tab(s), Oral, Once a day (at bedtime), PRN,? ?Investigating  Allergies  No Known Medication Allergies  Social History  Alcohol  Past, Beer, Liquor, 03/01/2018  Employment/School  Disabled w/ 9th grade education, 03/06/2018  Exercise  Home/Environment  Lives with Alone, Caregiver. Living situation: Home with assistance. Home equipment: Walker/Cane. Alcohol abuse in household: No. Substance abuse in household: No. Smoker in household: No. Injuries/Abuse/Neglect in household: No. Feels unsafe at home: No. Safe place to go: Yes. Agency(s)/Others notified: No. Family/Friends available for support: Yes. Concern for family members at home: No. Major illness in household: No. Financial concerns: No. TV/Computer concerns: No., 03/06/2018  Nutrition/Health  Regular, 03/06/2018  Sexual  Substance Abuse  Never, 02/20/2017  Tobacco  Former smoker, quit more than 30 days ago, N/A, 01/23/2019  Former smoker, quit more than 30 days ago, N/A, 01/22/2019  Family History  Acute myocardial infarction.: Grandmother.  Cancer: Sister.  Primary malignant neoplasm of colon: Father.  Lab Results  Test Name Test Result Date/Time Comments   Sodium Lvl 137 mmol/L 01/22/2019 21:47 CST    Potassium Lvl 6.1 mmol/L (Critical) 01/22/2019 21:47 CST Critical result called to galileo  young on _ 1/22/2019 22:11:51 CSTand verified by verbal readback. ?bjr   Chloride 96 mmol/L (Low) 01/22/2019 21:47 CST    CO2 23 mmol/L 01/22/2019 21:47 CST    Glucose Lvl 115 mg/dL 01/22/2019 21:47 CST    BUN 77 mg/dL (High) 01/22/2019 21:47 CST    Creatinine 10.99 mg/dL (Critical) 01/22/2019 21:47 CST Critical result called to galileo crump on 1/22/2019 22:12:08 CST_ and verified by verbal readback. ?bjr   WBC 11.9 x10(3)/mcL (High) 01/22/2019 21:47 CST    Hgb 12.2 gm/dL 01/22/2019 21:47 CST    Hct 39.8 % 01/22/2019 21:47 CST    Platelet 114 x10(3)/mcL (Low) 01/22/2019 21:47 CST